# Patient Record
Sex: FEMALE | Race: WHITE | NOT HISPANIC OR LATINO | Employment: FULL TIME | URBAN - METROPOLITAN AREA
[De-identification: names, ages, dates, MRNs, and addresses within clinical notes are randomized per-mention and may not be internally consistent; named-entity substitution may affect disease eponyms.]

---

## 2021-09-14 ENCOUNTER — HOSPITAL ENCOUNTER (EMERGENCY)
Facility: CLINIC | Age: 61
Discharge: HOME | End: 2021-09-14
Attending: EMERGENCY MEDICINE | Admitting: EMERGENCY MEDICINE
Payer: COMMERCIAL

## 2021-09-14 VITALS
DIASTOLIC BLOOD PRESSURE: 80 MMHG | HEIGHT: 67 IN | TEMPERATURE: 99 F | HEART RATE: 80 BPM | BODY MASS INDEX: 43.16 KG/M2 | WEIGHT: 275 LBS | RESPIRATION RATE: 29 BRPM | OXYGEN SATURATION: 100 % | SYSTOLIC BLOOD PRESSURE: 172 MMHG

## 2021-09-14 DIAGNOSIS — R06.00 DYSPNEA, UNSPECIFIED TYPE: ICD-10-CM

## 2021-09-14 DIAGNOSIS — R79.89 ELEVATED LFTS: ICD-10-CM

## 2021-09-14 DIAGNOSIS — R53.83 OTHER FATIGUE: ICD-10-CM

## 2021-09-14 DIAGNOSIS — E83.42 HYPOMAGNESEMIA: Primary | ICD-10-CM

## 2021-09-14 LAB
ALBUMIN SERPL-MCNC: 3.2 G/DL (ref 3.5–5)
ALP SERPL-CCNC: 157 U/L (ref 45–120)
ALT SERPL W P-5'-P-CCNC: 99 U/L (ref 0–45)
ANION GAP SERPL CALCULATED.3IONS-SCNC: 12 MMOL/L (ref 5–18)
AST SERPL W P-5'-P-CCNC: 156 U/L (ref 0–40)
BASOPHILS # BLD AUTO: 0 10E3/UL (ref 0–0.2)
BASOPHILS NFR BLD AUTO: 1 %
BILIRUB SERPL-MCNC: 2.1 MG/DL (ref 0–1)
BNP SERPL-MCNC: 38 PG/ML (ref 0–91)
BUN SERPL-MCNC: 4 MG/DL (ref 8–22)
CALCIUM SERPL-MCNC: 8.6 MG/DL (ref 8.5–10.5)
CHLORIDE BLD-SCNC: 95 MMOL/L (ref 98–107)
CO2 SERPL-SCNC: 24 MMOL/L (ref 22–31)
CREAT SERPL-MCNC: 0.58 MG/DL (ref 0.6–1.1)
D DIMER PPP FEU-MCNC: 0.44 UG/ML FEU (ref 0–0.5)
EOSINOPHIL # BLD AUTO: 0 10E3/UL (ref 0–0.7)
EOSINOPHIL NFR BLD AUTO: 1 %
ERYTHROCYTE [DISTWIDTH] IN BLOOD BY AUTOMATED COUNT: 12.5 % (ref 10–15)
GFR SERPL CREATININE-BSD FRML MDRD: >90 ML/MIN/1.73M2
GLUCOSE BLD-MCNC: 112 MG/DL (ref 70–125)
HCT VFR BLD AUTO: 36.9 % (ref 35–47)
HGB BLD-MCNC: 12.8 G/DL (ref 11.7–15.7)
IMM GRANULOCYTES # BLD: 0 10E3/UL
IMM GRANULOCYTES NFR BLD: 1 %
LYMPHOCYTES # BLD AUTO: 0.4 10E3/UL (ref 0.8–5.3)
LYMPHOCYTES NFR BLD AUTO: 9 %
MAGNESIUM SERPL-MCNC: 1.5 MG/DL (ref 1.8–2.6)
MCH RBC QN AUTO: 31.9 PG (ref 26.5–33)
MCHC RBC AUTO-ENTMCNC: 34.7 G/DL (ref 31.5–36.5)
MCV RBC AUTO: 92 FL (ref 78–100)
MONOCYTES # BLD AUTO: 0.5 10E3/UL (ref 0–1.3)
MONOCYTES NFR BLD AUTO: 12 %
NEUTROPHILS # BLD AUTO: 3.3 10E3/UL (ref 1.6–8.3)
NEUTROPHILS NFR BLD AUTO: 76 %
NRBC # BLD AUTO: 0 10E3/UL
NRBC BLD AUTO-RTO: 0 /100
PLATELET # BLD AUTO: 121 10E3/UL (ref 150–450)
POTASSIUM BLD-SCNC: 3.8 MMOL/L (ref 3.5–5)
PROT SERPL-MCNC: 6.7 G/DL (ref 6–8)
RBC # BLD AUTO: 4.01 10E6/UL (ref 3.8–5.2)
SODIUM SERPL-SCNC: 131 MMOL/L (ref 136–145)
TROPONIN I SERPL-MCNC: <0.01 NG/ML (ref 0–0.29)
WBC # BLD AUTO: 4.4 10E3/UL (ref 4–11)

## 2021-09-14 PROCEDURE — 83880 ASSAY OF NATRIURETIC PEPTIDE: CPT | Performed by: EMERGENCY MEDICINE

## 2021-09-14 PROCEDURE — 96361 HYDRATE IV INFUSION ADD-ON: CPT

## 2021-09-14 PROCEDURE — 93005 ELECTROCARDIOGRAM TRACING: CPT | Performed by: EMERGENCY MEDICINE

## 2021-09-14 PROCEDURE — 83735 ASSAY OF MAGNESIUM: CPT | Performed by: EMERGENCY MEDICINE

## 2021-09-14 PROCEDURE — 85379 FIBRIN DEGRADATION QUANT: CPT | Performed by: EMERGENCY MEDICINE

## 2021-09-14 PROCEDURE — 96360 HYDRATION IV INFUSION INIT: CPT

## 2021-09-14 PROCEDURE — 99284 EMERGENCY DEPT VISIT MOD MDM: CPT | Mod: 25

## 2021-09-14 PROCEDURE — 84484 ASSAY OF TROPONIN QUANT: CPT | Performed by: EMERGENCY MEDICINE

## 2021-09-14 PROCEDURE — 258N000003 HC RX IP 258 OP 636: Performed by: EMERGENCY MEDICINE

## 2021-09-14 PROCEDURE — 85025 COMPLETE CBC W/AUTO DIFF WBC: CPT | Performed by: EMERGENCY MEDICINE

## 2021-09-14 PROCEDURE — 82040 ASSAY OF SERUM ALBUMIN: CPT | Performed by: EMERGENCY MEDICINE

## 2021-09-14 PROCEDURE — 36415 COLL VENOUS BLD VENIPUNCTURE: CPT | Performed by: EMERGENCY MEDICINE

## 2021-09-14 PROCEDURE — 93005 ELECTROCARDIOGRAM TRACING: CPT

## 2021-09-14 RX ADMIN — SODIUM CHLORIDE 500 ML: 9 INJECTION, SOLUTION INTRAVENOUS at 08:58

## 2021-09-14 ASSESSMENT — ENCOUNTER SYMPTOMS
NERVOUS/ANXIOUS: 1
DIARRHEA: 0
DIZZINESS: 0
VOMITING: 0
HEMATURIA: 0
BLOOD IN STOOL: 0
SORE THROAT: 0
FEVER: 0
DIAPHORESIS: 0
SHORTNESS OF BREATH: 1
CHILLS: 0
NAUSEA: 0
JOINT SWELLING: 0
LIGHT-HEADEDNESS: 1
CONFUSION: 0
ABDOMINAL PAIN: 0
DYSURIA: 0

## 2021-09-14 ASSESSMENT — MIFFLIN-ST. JEOR: SCORE: 1855.02

## 2021-09-14 NOTE — ED PROVIDER NOTES
"  Emergency Department Encounter     Evaluation Date & Time:   9/14/2021  8:11 AM    CHIEF COMPLAINT:  Shortness of Breath and Generalized Weakness      Triage Note:The patient presents to the ED with shortness of breath that has been increasing over the past 3 weeks. The patient reports she is feeling shaky as well. The patient reports being stressed out at work and attributes some of this to \"stress\". The patient reports she does not have a primary care doctor. She notes her legs have become swollen recently as well.         ED COURSE & MEDICAL DECISION MAKING:     ED Course as of Sep 14 1317   Tue Sep 14, 2021   0949 D-dimer negative. Will not pursue PE further.  Trop negative.  Mag 1.5. Will replace orally as outpatient and have her follow up on this.  LFTs and bili a little elevated. Pt has no real abdominal pain/tenderness. Will discuss with her.      1023 Pt does admit to somewhat heavier drinking, but states intermittent. Discussed LFTs/bili and she will follow up as outpatient.  Primary care referral placed. Pt also will call a clinic she thought about seeing. She previously saw [facility], but her doctor left.          8:14 AM I met with the patient to gather history and to perform my initial exam. I discussed the plan for care while in the Emergency Department. PPE (gloves, eye protection, and N95 mask) was worn by me during patient encounters while patient wore mask. Pt here with ongoing symptoms of shortness of breath, feeling shaky for the past 3 weeks.  Pt lives alone, does not get regular healthcare, so she came in now as she was getting more anxious. She does report a lot of anxiety/stress at work and related to unexpected death of her boyfriend 1.5 years ago from PE.  Pt with HR around 100, normal RA O2 sats.  Will get labs, rule out PE with d-dimer as pt is low risk but HR around 100.  Nothing to suggest covid-19.  If workup all negative, will refer to primary care.    10:08 AM I re-evaluated " "and updated patient. She is reassured, HR 80s.  We discussed plans for discharge and patient is agreeable.  Referred to primary care provider for further evaluation and better primary care.  Pt understands return precautions.      At the conclusion of the encounter I discussed the results of all the tests and the disposition. The questions were answered. The patient or family acknowledged understanding and was agreeable with the care plan.      MEDICATIONS GIVEN IN THE EMERGENCY DEPARTMENT:  Medications   0.9% sodium chloride BOLUS (0 mLs Intravenous Stopped 9/14/21 1049)       NEW PRESCRIPTIONS STARTED AT TODAY'S ED VISIT:  Discharge Medication List as of 9/14/2021 10:51 AM      START taking these medications    Details   magnesium oxide (MAG-OX) 400 (241.3 Mg) MG tablet Take 1 tablet (400 mg) by mouth 2 times daily for 14 days, Disp-28 tablet, R-0, Local Print             HPI   HPI     Dafne D Kenny is a 61 year old female without a pertinent history who presents to this ED by walk in for evaluation of shortness of breath.    Patient reports shortness of breath and feeling \"shaky\" for the past 3 weeks. She denies any associated chest pain, vomiting, diaphoresis, diarrhea, melena, or hematochezia. Patient does note increased stress and anxiety related to work, as well as the unexpected loss of her boyfriend 1.5 years ago to a blood clot. Patient admits to bilateral ankle swelling for the past month and notes she traveled to [place] at the end of July. She denies a history of DVT or PE. Patient presents to ED today as she felt persistent shakiness and lightheadedness this morning and became concerned as she lives alone. Patient is fully vaccinated for COVID and denies sick contacts. Patient works from home. She does not receive regular primary care and denies prior diagnoses of medical conditions. Patient denies additional medical concerns or complaints at this time.      REVIEW OF SYSTEMS:  Review of Systems " "  Constitutional: Negative for chills, diaphoresis and fever.        Positive for \"shaky\".   HENT: Negative for sore throat.    Eyes: Negative for visual disturbance.   Respiratory: Positive for shortness of breath.    Cardiovascular: Positive for leg swelling (ankles bilaterally). Negative for chest pain.   Gastrointestinal: Negative for abdominal pain, blood in stool, diarrhea, nausea and vomiting.        No hematochezia or melena.   Endocrine: Negative for polyuria.   Genitourinary: Negative for dysuria and hematuria.        - urinary changes   Musculoskeletal: Negative for joint swelling.   Skin: Negative for rash.   Neurological: Positive for light-headedness. Negative for dizziness.   Psychiatric/Behavioral: Negative for confusion. The patient is nervous/anxious.    All other systems reviewed and are negative.      Medical History   History reviewed. No pertinent past medical history.    History reviewed. No pertinent surgical history.    History reviewed. No pertinent family history.    Social History     Tobacco Use     Smoking status: None   Substance Use Topics     Alcohol use: None     Drug use: None       magnesium oxide (MAG-OX) 400 (241.3 Mg) MG tablet        Physical Exam     Triage Vitals:  ED Triage Vitals   Enc Vitals Group      BP       Pulse       Resp       Temp       Temp src       SpO2       Weight       Height       Head Circumference       Peak Flow       Pain Score       Pain Loc       Pain Edu?       Excl. in GC?         Vitals:  BP (!) 172/80   Pulse 80   Temp 99  F (37.2  C) (Oral)   Resp 29   Ht 1.702 m (5' 7\")   Wt 124.7 kg (275 lb)   SpO2 100%   BMI 43.07 kg/m      PHYSICAL EXAM:   Physical Exam  Vitals and nursing note reviewed.   Constitutional:       General: She is not in acute distress.     Appearance: Normal appearance. She is obese.   HENT:      Head: Normocephalic and atraumatic.      Nose: Nose normal.      Mouth/Throat:      Mouth: Mucous membranes are moist.   Eyes: "      Pupils: Pupils are equal, round, and reactive to light.   Neck:      Vascular: No JVD.   Cardiovascular:      Rate and Rhythm: Normal rate and regular rhythm.      Pulses: Normal pulses.           Radial pulses are 2+ on the right side and 2+ on the left side.        Dorsalis pedis pulses are 2+ on the right side and 2+ on the left side.   Pulmonary:      Effort: Pulmonary effort is normal. No respiratory distress.      Breath sounds: Normal breath sounds.   Abdominal:      Palpations: Abdomen is soft.      Tenderness: There is no abdominal tenderness.   Musculoskeletal:      Cervical back: Full passive range of motion without pain and neck supple.      Comments: No calf tenderness. Trace bilateral ankle and pedal edema.   Skin:     General: Skin is warm.      Findings: No rash.   Neurological:      General: No focal deficit present.      Mental Status: She is alert. Mental status is at baseline.      Comments: Fluent speech, no acute lateralizing deficits   Psychiatric:         Mood and Affect: Mood is anxious.         Behavior: Behavior normal.       Results     LAB:  All pertinent labs reviewed and interpreted  Labs Ordered and Resulted from Time of ED Arrival Up to the Time of Departure from the ED   COMPREHENSIVE METABOLIC PANEL - Abnormal; Notable for the following components:       Result Value    Sodium 131 (*)     Chloride 95 (*)     Urea Nitrogen 4 (*)     Creatinine 0.58 (*)     Alkaline Phosphatase 157 (*)      (*)     ALT 99 (*)     Albumin 3.2 (*)     Bilirubin Total 2.1 (*)     All other components within normal limits   MAGNESIUM - Abnormal; Notable for the following components:    Magnesium 1.5 (*)     All other components within normal limits   CBC WITH PLATELETS AND DIFFERENTIAL - Abnormal; Notable for the following components:    Platelet Count 121 (*)     Absolute Lymphocytes 0.4 (*)     All other components within normal limits   TROPONIN I - Normal   D DIMER QUANTITATIVE - Normal     Narrative:     This D-dimer assay is intended for use in conjunction with a clinical pretest probability assessment model to exclude pulmonary embolism (PE) and deep venous thrombosis (DVT) in outpatients suspected of PE or DVT. The cut-off value is 0.50 ug/mL FEU.   B-TYPE NATRIURETIC PEPTIDE ([facility] ONLY) - Normal   CBC WITH PLATELETS & DIFFERENTIAL    Narrative:     The following orders were created for panel order CBC with platelets differential.  Procedure                               Abnormality         Status                     ---------                               -----------         ------                     CBC with platelets and d...[[id number]]  Abnormal            Final result                 Please view results for these tests on the individual orders.   PERIPHERAL IV CATHETER   CARDIAC CONTINUOUS MONITORING       RADIOLOGY:  No orders to display                ECG:  NSR, rate 90, normal intervals, no acute ischemia    I have independently reviewed and interpreted the EKG(s) documented above     PROCEDURES:  Procedures:      FINAL IMPRESSION:    ICD-10-CM    1. Hypomagnesemia  E83.42 Primary Care Referral (ED)   2. Other fatigue  R53.83    3. Dyspnea, unspecified type  R06.00    4. Elevated LFTs  R79.89        0 minutes of critical care time      I, Kierra Steinberg, am serving as a scribe to document services personally performed by Dr. Kalpesh Long, based on my observations and the provider's statements to me. I, Kalpesh Long, DO attest that Kierra Steinberg is acting in a scribe capacity, has observed my performance of the services and has documented them in accordance with my direction.      Kalpesh Long DO  Emergency Medicine  [facility] EMERGENCY ROOM  9/14/2021  8:13 AM        Kalpesh Long MD  09/14/21 5449

## 2021-09-14 NOTE — ED TRIAGE NOTES
"The patient presents to the ED with shortness of breath that has been increasing over the past 3 weeks. The patient reports she is feeling shaky as well. The patient reports being stressed out at work and attributes some of this to \"stress\". The patient reports she does not have a primary care doctor. She notes her legs have become swollen recently as well.   "

## 2021-09-14 NOTE — DISCHARGE INSTRUCTIONS
Take magnesium supplement as directed and have this level rechecked. Follow up with a primary clinic - referral placed, but you can certainly call and see if there is a clinic you want to follow up with. Return for new/worsening symptoms as we discussed.     Follow up on magnesium level and liver enzymes as we discussed.

## 2021-09-16 LAB
ATRIAL RATE - MUSE: 90 BPM
DIASTOLIC BLOOD PRESSURE - MUSE: 89 MMHG
INTERPRETATION ECG - MUSE: NORMAL
P AXIS - MUSE: 49 DEGREES
PR INTERVAL - MUSE: 164 MS
QRS DURATION - MUSE: 88 MS
QT - MUSE: 388 MS
QTC - MUSE: 474 MS
R AXIS - MUSE: -29 DEGREES
SYSTOLIC BLOOD PRESSURE - MUSE: 197 MMHG
T AXIS - MUSE: 20 DEGREES
VENTRICULAR RATE- MUSE: 90 BPM

## 2021-11-15 ENCOUNTER — NURSE TRIAGE (OUTPATIENT)
Dept: NURSING | Facility: CLINIC | Age: 61
End: 2021-11-15
Payer: COMMERCIAL

## 2021-11-15 NOTE — TELEPHONE ENCOUNTER
Nurse Triage SBAR    Is this a 2nd Level Triage? NO    Situation:    Frequent urination  Severe back pain.     Background :     Back pain for about 1 week.   Lifted something heavy and thought pt hurt her back at that time.   Sits all day for work and a chair that is not ergonomic.     Assessment :    -Lower Back pain, Mild pain currently.    -Patient states pain can get severe intermittently with activity and movement.   -Sx started last week and has been ongoing.   -Urinary Frequency only sx. Denies fevers, blood in urine, pain/burning with urination, cloudy urine.    -Patient was lifting things last week and felt as if she might have injured her back.   -Denies numbness/tingling/weakness  -Denies chest pain or SOB.     Recommendation :     Per protocol, recommendations are for patient to be seen today or tomorrow in office. If no openings UCC was advised. Patient verbalized understanding and agrees with plan. Advised to call back if patient develops any new or worsening sx.      Protocol Recommended Disposition: Routine office follow-up appointment /today or tomorrow in office. UCC advised if no openings.     Chio Blank RN, BSN Nurse Triage Advisor 10:33 AM 11/15/2021       Reason for Disposition    Age > 50 and no history of prior similar back pain    Additional Information    Negative: Passed out (i.e., fainted, collapsed and was not responding)    Negative: Shock suspected (e.g., cold/pale/clammy skin, too weak to stand, low BP, rapid pulse)    Negative: Sounds like a life-threatening emergency to the triager    Negative: Major injury to the back (e.g., MVA, fall > 10 feet or 3 meters, penetrating injury, etc.)    Negative: Pain in the upper back over the ribs (rib cage) that radiates (travels) into the chest    Negative: Pain in the upper back over the ribs (rib cage) and worsened by coughing (or clearly increases with breathing)    Negative: SEVERE back pain of sudden onset and age > 60    Negative:  SEVERE abdominal pain (e.g., excruciating)    Negative: Abdominal pain and age > 60    Negative: Unable to urinate (or only a few drops) and bladder feels very full    Negative: Loss of bladder or bowel control (urine or bowel incontinence; wetting self, leaking stool) of new onset    Negative: Blood in urine (red, pink, or tea-colored)    Negative: Numbness (loss of sensation) in groin or rectal area    Negative: Pain radiates into groin, scrotum    Negative: Vomiting and pain over lower ribs of back (i.e., flank - kidney area)    Negative: Weakness of a leg or foot (e.g., unable to bear weight, dragging foot)    Negative: Patient sounds very sick or weak to the triager    Negative: Fever > 100.4 F (38.0 C) and flank pain    Negative: Pain or burning with passing urine (urination)    Negative: SEVERE back pain (e.g., excruciating, unable to do any normal activities) and not improved after pain medicine and CARE ADVICE    Negative: Numbness in an arm or hand (i.e., loss of sensation) and upper back pain    Negative: Numbness in a leg or foot (i.e., loss of sensation)    Negative: High-risk adult (e.g., history of cancer, history of HIV, or history of IV drug abuse)    Negative: Painful rash with multiple small blisters grouped together (i.e., dermatomal distribution or 'band' or 'stripe')    Negative: Pain radiates into the thigh or further down the leg, and in both legs    Protocols used: BACK PAIN-A-[place]  COVID 19 Nurse Triage Plan/Patient Instructions    Please be aware that novel coronavirus (COVID-19) may be circulating in the community. If you develop symptoms such as fever, cough, or SOB or if you have concerns about the presence of another infection including coronavirus (COVID-19), please contact your health care provider or visit [email or web address].     Disposition/Instructions    In-Person Visit with provider recommended. Reference Visit Selection Guide.    Thank you for taking steps to prevent  the spread of this virus.  o Limit your contact with others.  o Wear a simple mask to cover your cough.  o Wash your hands well and often.    Resources    [facility]: About COVID-19: [email or web address]    CDC: What to Do If You're Sick: www.cdc.gov/coronavirus/2019-ncov/about/steps-when-sick.html    CDC: Ending Home Isolation: www.cdc.gov/coronavirus/2019-ncov/hcp/disposition-in-home-patients.html     CDC: Caring for Someone: www.cdc.gov/coronavirus/2019-ncov/if-you-are-sick/care-for-someone.html     [facility]: Interim Guidance for Hospital Discharge to Home: www.[email or web address]    [facility] clinical trials (COVID-19 research studies): [email or web address]-clinical-trials     Below are the COVID-19 hotlines at the [place] Department of [facility] ([facility]). Interpreters are available.   o For health questions: Call 405-211-4843 or 1-187.689.8504 (7 a.m. to 7 p.m.)  o For questions about schools and childcare: Call 001-059-5487 or 1-839.487.4747 (7 a.m. to 7 p.m.)

## 2021-11-16 ENCOUNTER — APPOINTMENT (OUTPATIENT)
Dept: CT IMAGING | Facility: HOSPITAL | Age: 61
End: 2021-11-16
Attending: EMERGENCY MEDICINE
Payer: COMMERCIAL

## 2021-11-16 ENCOUNTER — HOSPITAL ENCOUNTER (EMERGENCY)
Facility: HOSPITAL | Age: 61
Discharge: HOME | End: 2021-11-16
Admitting: STUDENT IN AN ORGANIZED HEALTH CARE EDUCATION/TRAINING PROGRAM
Payer: COMMERCIAL

## 2021-11-16 ENCOUNTER — APPOINTMENT (OUTPATIENT)
Dept: RADIOLOGY | Facility: HOSPITAL | Age: 61
End: 2021-11-16
Attending: EMERGENCY MEDICINE
Payer: COMMERCIAL

## 2021-11-16 VITALS
WEIGHT: 275 LBS | HEART RATE: 100 BPM | DIASTOLIC BLOOD PRESSURE: 73 MMHG | RESPIRATION RATE: 18 BRPM | OXYGEN SATURATION: 98 % | BODY MASS INDEX: 43.07 KG/M2 | SYSTOLIC BLOOD PRESSURE: 143 MMHG | TEMPERATURE: 98.4 F

## 2021-11-16 DIAGNOSIS — E83.42 HYPOMAGNESEMIA: ICD-10-CM
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
## 2021-11-16 DIAGNOSIS — R60.9 DEPENDENT EDEMA: ICD-10-CM

## 2021-11-16 DIAGNOSIS — R79.89 ELEVATED LFTS: ICD-10-CM

## 2021-11-16 DIAGNOSIS — M54.50 MIDLINE LOW BACK PAIN WITHOUT SCIATICA, UNSPECIFIED CHRONICITY: ICD-10-CM

## 2021-11-16 DIAGNOSIS — Z78.9 ALCOHOL USE: ICD-10-CM

## 2021-11-16 LAB
ALBUMIN SERPL-MCNC: 3.3 G/DL (ref 3.5–5)
ALBUMIN UR-MCNC: NEGATIVE MG/DL
ALP SERPL-CCNC: 191 U/L (ref 45–120)
ALT SERPL W P-5'-P-CCNC: 37 U/L (ref 0–45)
ANION GAP SERPL CALCULATED.3IONS-SCNC: 14 MMOL/L (ref 5–18)
APPEARANCE UR: ABNORMAL
AST SERPL W P-5'-P-CCNC: 69 U/L (ref 0–40)
BACTERIA #/AREA URNS HPF: ABNORMAL /HPF
BASOPHILS # BLD AUTO: 0.1 10E3/UL (ref 0–0.2)
BASOPHILS NFR BLD AUTO: 1 %
BILIRUB DIRECT SERPL-MCNC: 0.6 MG/DL
BILIRUB SERPL-MCNC: 1.8 MG/DL (ref 0–1)
BILIRUB UR QL STRIP: NEGATIVE
BNP SERPL-MCNC: 39 PG/ML (ref 0–91)
BUN SERPL-MCNC: 4 MG/DL (ref 8–22)
CALCIUM SERPL-MCNC: 9.2 MG/DL (ref 8.5–10.5)
CHLORIDE BLD-SCNC: 98 MMOL/L (ref 98–107)
CO2 SERPL-SCNC: 22 MMOL/L (ref 22–31)
COLOR UR AUTO: YELLOW
CREAT SERPL-MCNC: 0.63 MG/DL (ref 0.6–1.1)
EOSINOPHIL # BLD AUTO: 0 10E3/UL (ref 0–0.7)
EOSINOPHIL NFR BLD AUTO: 1 %
ERYTHROCYTE [DISTWIDTH] IN BLOOD BY AUTOMATED COUNT: 15 % (ref 10–15)
FLUAV RNA SPEC QL NAA+PROBE: NEGATIVE
FLUBV RNA RESP QL NAA+PROBE: NEGATIVE
GFR SERPL CREATININE-BSD FRML MDRD: >90 ML/MIN/1.73M2
GLUCOSE BLD-MCNC: 126 MG/DL (ref 70–125)
GLUCOSE UR STRIP-MCNC: NEGATIVE MG/DL
HCT VFR BLD AUTO: 37.4 % (ref 35–47)
HGB BLD-MCNC: 12.7 G/DL (ref 11.7–15.7)
HGB UR QL STRIP: NEGATIVE
IMM GRANULOCYTES # BLD: 0.1 10E3/UL
IMM GRANULOCYTES NFR BLD: 1 %
KETONES UR STRIP-MCNC: NEGATIVE MG/DL
LEUKOCYTE ESTERASE UR QL STRIP: ABNORMAL
LYMPHOCYTES # BLD AUTO: 0.7 10E3/UL (ref 0.8–5.3)
LYMPHOCYTES NFR BLD AUTO: 9 %
MAGNESIUM SERPL-MCNC: 1.4 MG/DL (ref 1.8–2.6)
MCH RBC QN AUTO: 32.2 PG (ref 26.5–33)
MCHC RBC AUTO-ENTMCNC: 34 G/DL (ref 31.5–36.5)
MCV RBC AUTO: 95 FL (ref 78–100)
MONOCYTES # BLD AUTO: 0.6 10E3/UL (ref 0–1.3)
MONOCYTES NFR BLD AUTO: 8 %
NEUTROPHILS # BLD AUTO: 6 10E3/UL (ref 1.6–8.3)
NEUTROPHILS NFR BLD AUTO: 80 %
NITRATE UR QL: NEGATIVE
NRBC # BLD AUTO: 0 10E3/UL
NRBC BLD AUTO-RTO: 0 /100
PH UR STRIP: 5.5 [PH] (ref 5–7)
PLATELET # BLD AUTO: 190 10E3/UL (ref 150–450)
POTASSIUM BLD-SCNC: 3.5 MMOL/L (ref 3.5–5)
PROT SERPL-MCNC: 7.3 G/DL (ref 6–8)
RBC # BLD AUTO: 3.94 10E6/UL (ref 3.8–5.2)
RBC URINE: 2 /HPF
SARS-COV-2 RNA RESP QL NAA+PROBE: NEGATIVE
SODIUM SERPL-SCNC: 134 MMOL/L (ref 136–145)
SP GR UR STRIP: 1.01 (ref 1–1.03)
SQUAMOUS EPITHELIAL: 31 /HPF
TROPONIN I SERPL-MCNC: <0.01 NG/ML (ref 0–0.29)
UROBILINOGEN UR STRIP-MCNC: <2 MG/DL
WBC # BLD AUTO: 7.4 10E3/UL (ref 4–11)
WBC URINE: 7 /HPF

## 2021-11-16 PROCEDURE — 99285 EMERGENCY DEPT VISIT HI MDM: CPT | Mod: 25

## 2021-11-16 PROCEDURE — 71046 X-RAY EXAM CHEST 2 VIEWS: CPT

## 2021-11-16 PROCEDURE — 83880 ASSAY OF NATRIURETIC PEPTIDE: CPT | Performed by: EMERGENCY MEDICINE

## 2021-11-16 PROCEDURE — 36415 COLL VENOUS BLD VENIPUNCTURE: CPT | Performed by: EMERGENCY MEDICINE

## 2021-11-16 PROCEDURE — 87086 URINE CULTURE/COLONY COUNT: CPT | Performed by: PHYSICIAN ASSISTANT

## 2021-11-16 PROCEDURE — 80048 BASIC METABOLIC PNL TOTAL CA: CPT | Performed by: EMERGENCY MEDICINE

## 2021-11-16 PROCEDURE — 83735 ASSAY OF MAGNESIUM: CPT | Performed by: PHYSICIAN ASSISTANT

## 2021-11-16 PROCEDURE — 85025 COMPLETE CBC W/AUTO DIFF WBC: CPT | Performed by: EMERGENCY MEDICINE

## 2021-11-16 PROCEDURE — 87636 SARSCOV2 & INF A&B AMP PRB: CPT | Performed by: EMERGENCY MEDICINE

## 2021-11-16 PROCEDURE — 82248 BILIRUBIN DIRECT: CPT | Performed by: PHYSICIAN ASSISTANT

## 2021-11-16 PROCEDURE — 70450 CT HEAD/BRAIN W/O DYE: CPT

## 2021-11-16 PROCEDURE — 93005 ELECTROCARDIOGRAM TRACING: CPT | Performed by: EMERGENCY MEDICINE

## 2021-11-16 PROCEDURE — 72100 X-RAY EXAM L-S SPINE 2/3 VWS: CPT

## 2021-11-16 PROCEDURE — 250N000013 HC RX MED GY IP 250 OP 250 PS 637: Performed by: PHYSICIAN ASSISTANT

## 2021-11-16 PROCEDURE — 81001 URINALYSIS AUTO W/SCOPE: CPT | Performed by: PHYSICIAN ASSISTANT

## 2021-11-16 PROCEDURE — 84484 ASSAY OF TROPONIN QUANT: CPT | Performed by: EMERGENCY MEDICINE

## 2021-11-16 RX ORDER — FUROSEMIDE 20 MG
20 TABLET ORAL DAILY
Qty: 3 TABLET | Refills: 0 | Status: SHIPPED | OUTPATIENT
Start: 2021-11-16 | End: 2021-12-13

## 2021-11-16 RX ORDER — OXYCODONE HYDROCHLORIDE 5 MG/1
5 TABLET ORAL EVERY 6 HOURS PRN
Qty: 8 TABLET | Refills: 0 | Status: ON HOLD | OUTPATIENT
Start: 2021-11-16 | End: 2021-12-21

## 2021-11-16 RX ORDER — MULTIVITAMIN WITH IRON
1 TABLET ORAL DAILY
Qty: 30 TABLET | Refills: 0 | Status: ON HOLD | OUTPATIENT
Start: 2021-11-16 | End: 2021-12-21

## 2021-11-16 RX ORDER — LIDOCAINE 50 MG/G
1 PATCH TOPICAL EVERY 24 HOURS
Qty: 10 PATCH | Refills: 0 | Status: SHIPPED | OUTPATIENT
Start: 2021-11-16 | End: 2021-11-26

## 2021-11-16 RX ORDER — MAGNESIUM OXIDE 400 MG/1
400 TABLET ORAL ONCE
Status: COMPLETED | OUTPATIENT
Start: 2021-11-16 | End: 2021-11-16

## 2021-11-16 RX ORDER — OXYCODONE HYDROCHLORIDE 5 MG/1
10 TABLET ORAL ONCE
Status: COMPLETED | OUTPATIENT
Start: 2021-11-16 | End: 2021-11-16

## 2021-11-16 RX ADMIN — MAGNESIUM OXIDE TAB 400 MG (241.3 MG ELEMENTAL MG) 400 MG: 400 (241.3 MG) TAB at 20:41

## 2021-11-16 RX ADMIN — OXYCODONE HYDROCHLORIDE 10 MG: 5 TABLET ORAL at 20:27

## 2021-11-16 ASSESSMENT — ENCOUNTER SYMPTOMS
VOMITING: 0
DYSURIA: 0
NECK STIFFNESS: 0
CHILLS: 0
PALPITATIONS: 0
HEMATURIA: 0
FEVER: 0
SHORTNESS OF BREATH: 1
ABDOMINAL PAIN: 0
WEAKNESS: 1
DIZZINESS: 0
FREQUENCY: 1
HEADACHES: 0
DIARRHEA: 0
NAUSEA: 0
DIAPHORESIS: 0
NECK PAIN: 0
BACK PAIN: 1
HEADACHES: 1
COUGH: 0

## 2021-11-16 NOTE — ED PROVIDER NOTES
ED Triage Provider Note  [facility]  Encounter Date: Nov 16, 2021    History:  Chief Complaint   Patient presents with     Leg Swelling     Shortness of Breath     Dafne Cox is a 61 year old female who presents to the ED with fall today, shortness of breath and leg swelling for the past month patient states that for the past month she has had bilateral lower extremity swelling.  Accompanied with shortness of breath.  She denies any chest pain, pleurisy, no ripping or tearing chest discomfort of the back or shoulders, no leg skin changes or joint pain.  Patient had a mechanical fall the day where she fell backwards in her bedroom hitting her head without loss of consciousness, again no vomiting she denies any medications for anticoagulation.    Review of Systems:  Review of Systems   Constitutional: Negative for fever.   Respiratory: Positive for shortness of breath.    Cardiovascular: Positive for leg swelling. Negative for chest pain.   Neurological: Positive for headaches.          Exam:  BP (!) 171/78   Pulse 112   Temp 98.3  F (36.8  C) (Oral)   Resp 20   SpO2 100%   General: No acute distress. Appears stated age.   Cardio: Regular rate, extremities well perfused  Resp: Normal work of breathing, distant breath sounds  Extremity: 1+ pitting edema bilateral lower extremities, no unilateral leg swelling, no joint erythema or warmth  Neuro: Alert. CN II-XII grossly intact. Grossly intact strength.       Medical Decision Making:  Patient arriving to the ED with problem as above. A medical screening exam was performed.  Labs and imaging orders initiated from Triage. The patient is appropriate to wait in triage.      HONEY RIVERS MD on 11/16/2021 at 4:23 PM      Lab/Imaging Results:  Results for orders placed or performed during the hospital encounter of 11/16/21   CT Head w/o Contrast    Impression    IMPRESSION: No acute intracranial process.   XR Chest 2 Views    Impression     IMPRESSION: Negative chest.   XR Lumbar Spine 2/3 Views    Impression    IMPRESSION: Transitional partially sacralized L5 vertebral body (sacralized on the right). Normal alignment. Normal vertebral body heights without compression fracture. Moderate disc space narrowing at L4-L5 with mild endplate sclerosis and marginal   osteophyte. The disc spaces are otherwise maintained. Mild to moderate facet arthropathy at L3-L4 and L4-L5. The sacrum and visualized iliac wings are unremarkable. Calcified structure within the pelvis measuring 3.5 cm, likely a uterine fibroid.   Basic metabolic panel   Result Value Ref Range    Sodium 134 (L) 136 - 145 mmol/L    Potassium 3.5 3.5 - 5.0 mmol/L    Chloride 98 98 - 107 mmol/L    Carbon Dioxide (CO2) 22 22 - 31 mmol/L    Anion Gap 14 5 - 18 mmol/L    Urea Nitrogen 4 (L) 8 - 22 mg/dL    Creatinine 0.63 0.60 - 1.10 mg/dL    Calcium 9.2 8.5 - 10.5 mg/dL    Glucose 126 (H) 70 - 125 mg/dL    GFR Estimate >90 >60 mL/min/1.73m2   Result Value Ref Range    Troponin I <0.01 0.00 - 0.29 ng/mL   B-Type Natriuretic Peptide ([facility] Only)   Result Value Ref Range    BNP 39 0 - 91 pg/mL   Symptomatic Influenza A/B & SARS-CoV2 (COVID-19) Virus PCR Multiplex Nasopharyngeal    Specimen: Nasopharyngeal; Swab   Result Value Ref Range    Influenza A target Negative Negative    Influenza B target Negative Negative    SARS CoV2 PCR Negative Negative   CBC with platelets and differential   Result Value Ref Range    WBC Count 7.4 4.0 - 11.0 10e3/uL    RBC Count 3.94 3.80 - 5.20 10e6/uL    Hemoglobin 12.7 11.7 - 15.7 g/dL    Hematocrit 37.4 35.0 - 47.0 %    MCV 95 78 - 100 fL    MCH 32.2 26.5 - 33.0 pg    MCHC 34.0 31.5 - 36.5 g/dL    RDW 15.0 10.0 - 15.0 %    Platelet Count 190 150 - 450 10e3/uL    % Neutrophils 80 %    % Lymphocytes 9 %    % Monocytes 8 %    % Eosinophils 1 %    % Basophils 1 %    % Immature Granulocytes 1 %    NRBCs per 100 WBC 0 <1 /100    Absolute Neutrophils 6.0 1.6 - 8.3 10e3/uL     Absolute Lymphocytes 0.7 (L) 0.8 - 5.3 10e3/uL    Absolute Monocytes 0.6 0.0 - 1.3 10e3/uL    Absolute Eosinophils 0.0 0.0 - 0.7 10e3/uL    Absolute Basophils 0.1 0.0 - 0.2 10e3/uL    Absolute Immature Granulocytes 0.1 (H) <=0.0 10e3/uL    Absolute NRBCs 0.0 10e3/uL       Interventions:  Medications - No data to display    Diagnosis:  No diagnosis found.      Ryan Becerril MD  Emergency Medicine  [facility] EMERGENCY DEPARTMENT       Ryan Becerril MD  11/16/21 8572

## 2021-11-16 NOTE — ED TRIAGE NOTES
Pt brought by [facility] medics from home for evaluation of BLE edema, dyspnea on exertion x 1-2 months, and a fall earlier today.     Pt states she went to kick a sweatshirt on the floor this morning when she lost her footing and fell backwards. Pt hit the back of her head on the carpeted floor. Denies LOC. Pt called 911 but declined hospital transport. Pt had a clinic appt this afternoon but she cancelled it, and ultimately decided to call 911 to come here for evaluation of the edema and dyspnea.    Denies anticoagulants. Denies cough or fever. Denies chest pain.

## 2021-11-17 LAB
ATRIAL RATE - MUSE: 105 BPM
BACTERIA UR CULT: NORMAL
DIASTOLIC BLOOD PRESSURE - MUSE: NORMAL MMHG
INTERPRETATION ECG - MUSE: NORMAL
P AXIS - MUSE: 38 DEGREES
PR INTERVAL - MUSE: 156 MS
QRS DURATION - MUSE: 84 MS
QT - MUSE: 370 MS
QTC - MUSE: 489 MS
R AXIS - MUSE: -17 DEGREES
SYSTOLIC BLOOD PRESSURE - MUSE: NORMAL MMHG
T AXIS - MUSE: 42 DEGREES
VENTRICULAR RATE- MUSE: 105 BPM

## 2021-11-17 NOTE — DISCHARGE INSTRUCTIONS
As we discussed, please consider discussing alcohol cessation with your primary care provider.  Your low magnesium levels likely due to your alcohol use and poor dietary magnesium intake.  I will place you on supplementation for home.  Your elevated liver function tests are likely due to your alcohol use, and should be followed by your primary care provider.  For your lower extremity swelling, please use Ace wraps at home, elevate the legs above heart level, and we will place you on 3 days of the water pill to see if this improves your symptoms.  I have placed a referral to the spine center for your ongoing back pain.  I will send you home with several tablets of oxycodone for breakthrough pain which is a strong pain medication.  This can make you drowsy -do not take this while working, driving, or operating heavy machinery.  I would attempt warm compresses, gentle stretching, and ibuprofen for pain relief at home.  It is very important that you see establish a regular primary care provider for continued outpatient work-up.  If at anytime you develop return of shortness of breath, chest pain, increased swelling in your legs, severe pain in your back, numbness in your groin, loss of control of your bladder or bowel, new weakness or numbness in your legs, another fall, or any new or concerning symptoms please return to the ER for further evaluation.    Your blood pressure was elevated in the emergencydepartment today and requires recheck and close follow-up in your primary care clinic. Untreated blood pressure can cause serious complications including, but not limited to stroke, heart attack/failure, and kidney disease.  Please make a close follow-up appointment to have this recheck performed. Please return to the emergency department immediately if you develop a severe headache, vision changes, chest pain, shortness of breath, orabdominal pain.

## 2021-11-17 NOTE — ED PROVIDER NOTES
Emergency Department Encounter   NAME: Dafne Cox ; AGE: 61 year old female ; YOB: 1960 ; MRN: [id number] ; PCP: No Ref-Primary, Physician   ED PROVIDER: Shikha Triana PA-C    Evaluation Date & Time:   11/16/2021  7:09 PM    CHIEF COMPLAINT:  Leg Swelling and Shortness of Breath      Impression and Plan   MDM: Dafne Cox is a 61 year old female with a pertinent history of alcohol use and back pain, who presents to the ED by EMS for evaluation of back pain.  The patient presented to the emergency department for evaluation after a mechanical fall today.  She attempted to kick a sweatshirt on the floor, lost her balance, and fell onto her buttocks and hit the back of her head on the carpeted floor.  She states that she became immediately upset following this, and felt short of breath and dizzy, prompting her to contact EMS.    Here in the ED, patient is afebrile and vitally stable.  Mildly tachycardic and hypertensive, but generally well-appearing and sitting comfortably in the bed.  She reports the shortness of breath has completely resolved.  Nursing note mentions ongoing dyspnea on exertion, however she denies this to me.  Her lungs are clear to auscultation without any crackles, wheezing or tightness.  No findings to suggest asthma or COPD.  No recent infectious symptoms, though Covid swab sent by nursing staff.  Chest x-ray obtained and shows no consolidation or infiltrate concerning for pneumonia, pleural effusion, pneumothorax, or rib fractures.  Screening EKG obtained shows mild sinus tachycardia though no evidence of acute ischemia.  No active chest pain and troponin is negative.  No symptoms to suggest ACS.  Patient reports a similar episode in the past when she was upset and anxious, and her shortness of breath is most consistent with anxiety/stress reaction.      She has had ongoing low back pain for several months.  Unchanged following the fall, though has been worsening recently.   She does have tenderness to her midline lumbar spine.  X-ray was obtained which showed no evidence of compression fracture or abnormal alignment.  Moderate disc space narrowing at L4-L5, and mild to moderate facet arthropathy at L3-L4 and L4-L5 which could certainly contribute to ongoing pain.  Patient is not experiencing any red flag symptoms at this time -no saddle anesthesia, bladder or bowel continence, or lower extremity weakness or numbness.  No findings to suggest cauda equina or indicate need for emergent MRI.  Given the length of her symptoms and worsening pain, I do feel that she would benefit from referral to the spine center which was placed.  Discussed potential of physical therapy, outpatient MRI, and will give her several tablets of oxycodone for breakthrough pain as this worked very well for her here in the ED.    She has been experiencing some urinary frequency though no other urinary symptoms.  Her urine sample was grossly contaminated with squamous cells which is the likely source of the small amount of leukocytes and moderate bacteria.  No nitrates.  Urine culture sent and pending, though my suspicion for acute infection is quite low which I discussed with the patient.  Will contact if urine culture comes back positive.    She does have edema to her bilateral lower ankles and feet which have been present for several months.  No pain or tenderness into the calf, skin changes, erythema, palpable cords or any findings to suggest DVT.  No history of CHF and chest x-ray is clear without pulmonary congestion and BNP is within normal limits.  Renal function normal.  Her edema is consistent with dependent edema and we discussed supportive measures including reduced salt intake, elevating, compressions, and will give her a 3-day course of Lasix.    Patient has been consuming around 8 beers per day for the past several months.  Her magnesium is again low today at 1.4 likely due to her alcohol intake and  poor dietary intake of magnesium.  We will place her on supplementation for home.  Advise discussion with her primary care provider in regards to alcohol cessation.  Her chronic liver dysfunction is again seen today likely consistent with cirrhosis due to her alcohol use.  No abdominal pain or GI symptoms and abdominal exam is benign.    Head CT obtained by triage staff which was negative for any acute intracranial traumatic injury.  Patient has no neck pain and C-spine is cleared using cervical spine rules.  Remainder of trauma exam unremarkable.  Not anticoagulated.  Had 1 beer earlier this morning and is clinically sober.  No neurologic deficits.    Patient had good relief of her back pain with a dose of oxycodone here in the ED.  She ambulated successfully to the restroom and back with the use of a walker, however nursing staff reported that she was picking it up and not relying on it.  She has a cane at home that she uses to ambulate.  With her improved pain, no concerning findings on work-up, stable vitals, and passing ambulatory challenge, she is a good candidate for discharge to home with continued follow-up in her outpatient clinic.  Patient is comfortable with this plan and found friend to drive home and stay with her tonight.  We reviewed the importance of follow-up both in her primary care clinic and with the spine center, prescriptions, and concerning signs and symptoms to return to the ED.  She verbalized understanding is comfortable with the plan.  Discharged home in good condition.     ED COURSE:  7:31 PM I met and introduced myself to the patient. I gathered initial history and performed my physical exam. We discussed plan for initial workup.   9:30 PM recheck the patient, and she ambulated successfully with a walker to the bathroom and back.  Nursing staff reports that she was not relying on the walker and was lifting it in the air.  9:45 PM We discussed discharge, follow-up, and reasons to return  to the emergency department.    At the conclusion of the encounter I discussed the results of all the tests and the disposition. The questions were answered. The patient or family acknowledged understanding and was agreeable with the care plan.    FINAL IMPRESSION:    ICD-10-CM    1. Fall, initial encounter  W19.XXXA    2. Midline low back pain without sciatica, unspecified chronicity  M54.50 Spine Referral   3. Alcohol use  Z72.89    4. Elevated LFTs  R79.89    5. Hypomagnesemia  E83.42    6. Dependent edema  R60.9          MEDICATIONS GIVEN IN THE EMERGENCY DEPARTMENT:  Medications   oxyCODONE (ROXICODONE) tablet 10 mg (10 mg Oral Given 11/16/21 2027)   magnesium oxide (MAG-OX) tablet 400 mg (400 mg Oral Given 11/16/21 2041)         NEW PRESCRIPTIONS STARTED AT TODAY'S ED VISIT:  New Prescriptions    FUROSEMIDE (LASIX) 20 MG TABLET    Take 1 tablet (20 mg) by mouth daily for 3 days    LIDOCAINE (LIDODERM) 5 % PATCH    Place 1 patch onto the skin every 24 hours for 10 days    MAGNESIUM 250 MG TABLET    Take 1 tablet (250 mg) by mouth daily    OXYCODONE (ROXICODONE) 5 MG TABLET    Take 1 tablet (5 mg) by mouth every 6 hours as needed for pain         HPI   Patient information was obtained from: Patient    Use of Intrepreter: N/A     Dafne MANZO Kenny is a 61 year old female with a pertinent history of alcohol use and back pain, who presents to the ED by EMS for evaluation of back pain.     The patient presents to the emergency department via EMS following a fall today.  She reports that she has had ongoing back pain for several months which she feels is exacerbated by working sitting at her kitchen table.  She has not been seen for this pain before, however it has been significantly worse recently.  This morning, she went to kick a sweatshirt on the floor when she lost her balance and fell backwards.  She landed on her buttocks, however did hit her head on the carpeted floor.  No LOC.  She initially called 911, however  declined hospital transport.  She then called them back and was transported to the ED.  Patient admits to drinking on average 8 beers per day and has been doing this for several months which she feels has been exacerbated by the pandemic.  She has only had one beer today.  No other illicit drug use.  After she fell, she states that she became very upset and had shortness of breath and dizziness.  The symptoms resolved prior to arrival in the emergency department and she states that her breathing currently feels at baseline.  She does have a mild headache, although no neck, back, chest, or abdominal pain.  She has had ongoing swelling to her bilateral ankles and feet, and had a appointment scheduled to her clinic today to further discuss this.  She does have      REVIEW OF SYSTEMS:  Review of Systems   Constitutional: Negative for chills, diaphoresis and fever.   Respiratory: Positive for shortness of breath (resolved). Negative for cough.    Cardiovascular: Positive for leg swelling. Negative for chest pain and palpitations.   Gastrointestinal: Negative for abdominal pain, diarrhea, nausea and vomiting.   Genitourinary: Positive for frequency. Negative for dysuria and hematuria.        Negative for saddle anesthesia.  Negative for bladder or bowel incontinence.   Musculoskeletal: Positive for back pain. Negative for neck pain and neck stiffness.   Skin: Negative for rash.   Neurological: Positive for weakness (generalized). Negative for dizziness, syncope and headaches.   All other systems reviewed and are negative.        Medical History     No past medical history on file.    No past surgical history on file.    No family history on file.    Social History     Tobacco Use     Smoking status: Not on file     Smokeless tobacco: Not on file   Substance Use Topics     Alcohol use: Not on file     Drug use: Not on file       furosemide (LASIX) 20 MG tablet  lidocaine (LIDODERM) 5 % patch  magnesium 250 MG  tablet  oxyCODONE (ROXICODONE) 5 MG tablet          Physical Exam     First Vitals:  Patient Vitals for the past 24 hrs:   BP Temp Temp src Pulse Resp SpO2 Weight   11/16/21 2100 (!) 143/73 -- -- 100 18 98 % --   11/16/21 2030 (!) 142/80 -- -- 98 18 99 % --   11/16/21 2000 (!) 184/88 -- -- 100 18 99 % --   11/16/21 1945 (!) 172/73 -- -- 101 18 98 % --   11/16/21 1930 (!) 175/68 98.4  F (36.9  C) Oral 100 18 100 % 124.7 kg (275 lb)   11/16/21 1624 (!) 171/78 98.3  F (36.8  C) Oral 112 20 100 % --         PHYSICAL EXAM:   Physical Exam  Vitals and nursing note reviewed.   Constitutional:       Appearance: She is not ill-appearing or toxic-appearing.      Comments: Patient is obese and disheveled.  Poor hygiene.  No acute distress and sitting comfortably in her bed.   HENT:      Head: Normocephalic and atraumatic.      Mouth/Throat:      Mouth: Mucous membranes are moist.   Eyes:      Extraocular Movements: Extraocular movements intact.      Pupils: Pupils are equal, round, and reactive to light.   Neck:      Comments: No midline spinal tenderness or palpable bony step-offs.  No pain with range of motion or axial loading.  Cardiovascular:      Rate and Rhythm: Normal rate and regular rhythm.      Pulses: Normal pulses.      Heart sounds: Normal heart sounds. No murmur heard.      Pulmonary:      Effort: Pulmonary effort is normal. No tachypnea or respiratory distress.      Breath sounds: Normal breath sounds. No decreased breath sounds, wheezing, rhonchi or rales.   Abdominal:      General: Bowel sounds are normal.      Palpations: Abdomen is soft.      Tenderness: There is no abdominal tenderness. There is no guarding or rebound.      Comments: No ascites or fluid wave.   Musculoskeletal:      Cervical back: Normal range of motion and neck supple.      Comments: Pelvis is stable.  Midline tenderness over lumbar spine.  No palpable bony step-offs.  2+ edema to her bilateral ankles and feet. No swelling or tenderness to  calves. No palpable cords or skin changes.  Extremities are warm without pallor or coolness.  2+ DP and PT pulses.   Skin:     General: Skin is warm and dry.      Comments: No jaundice.   Neurological:      Mental Status: She is alert and oriented to person, place, and time. Mental status is at baseline.      GCS: GCS eye subscore is 4. GCS verbal subscore is 5. GCS motor subscore is 6.      Cranial Nerves: No facial asymmetry.      Comments: Answering questions appropriately with normal speech.  No concern for intoxication.  Cranial nerves III through XII intact.  5 out of 5 strength with , flexion extension at the elbow joint, flexion at the shoulder joint, dorsiflexion and plantarflexion, hip flexion, and knee flexion.  Sensory exam to all extremities intact.             Results     LAB:  All pertinent labs reviewed and interpreted  Labs Ordered and Resulted from Time of ED Arrival to Time of ED Departure   BASIC METABOLIC PANEL - Abnormal       Result Value    Sodium 134 (*)     Potassium 3.5      Chloride 98      Carbon Dioxide (CO2) 22      Anion Gap 14      Urea Nitrogen 4 (*)     Creatinine 0.63      Calcium 9.2      Glucose 126 (*)     GFR Estimate >90     CBC WITH PLATELETS AND DIFFERENTIAL - Abnormal    WBC Count 7.4      RBC Count 3.94      Hemoglobin 12.7      Hematocrit 37.4      MCV 95      MCH 32.2      MCHC 34.0      RDW 15.0      Platelet Count 190      % Neutrophils 80      % Lymphocytes 9      % Monocytes 8      % Eosinophils 1      % Basophils 1      % Immature Granulocytes 1      NRBCs per 100 WBC 0      Absolute Neutrophils 6.0      Absolute Lymphocytes 0.7 (*)     Absolute Monocytes 0.6      Absolute Eosinophils 0.0      Absolute Basophils 0.1      Absolute Immature Granulocytes 0.1 (*)     Absolute NRBCs 0.0     UA MACROSCOPIC WITH REFLEX TO MICRO AND CULTURE - Abnormal    Color Urine Yellow      Appearance Urine Turbid (*)     Glucose Urine Negative      Bilirubin Urine Negative       Ketones Urine Negative      Specific Gravity Urine 1.008      Blood Urine Negative      pH Urine 5.5      Protein Albumin Urine Negative      Urobilinogen Urine <2.0      Nitrite Urine Negative      Leukocyte Esterase Urine 250 Bal/uL (*)     Bacteria Urine Moderate (*)     RBC Urine 2      WBC Urine 7 (*)     Squamous Epithelials Urine 31 (*)    MAGNESIUM - Abnormal    Magnesium 1.4 (*)    HEPATIC FUNCTION PANEL - Abnormal    Bilirubin Total 1.8 (*)     Bilirubin Direct 0.6 (*)     Protein Total 7.3      Albumin 3.3 (*)     Alkaline Phosphatase 191 (*)     AST 69 (*)     ALT 37     TROPONIN I - Normal    Troponin I <0.01     B-TYPE NATRIURETIC PEPTIDE ([facility] ONLY) - Normal    BNP 39     INFLUENZA A/B & SARS-COV2 PCR MULTIPLEX - Normal    Influenza A target Negative      Influenza B target Negative      SARS CoV2 PCR Negative     URINE CULTURE       RADIOLOGY:  CT Head w/o Contrast   Final Result   IMPRESSION: No acute intracranial process.      XR Chest 2 Views   Final Result   IMPRESSION: Negative chest.      XR Lumbar Spine 2/3 Views   Final Result   IMPRESSION: Transitional partially sacralized L5 vertebral body (sacralized on the right). Normal alignment. Normal vertebral body heights without compression fracture. Moderate disc space narrowing at L4-L5 with mild endplate sclerosis and marginal    osteophyte. The disc spaces are otherwise maintained. Mild to moderate facet arthropathy at L3-L4 and L4-L5. The sacrum and visualized iliac wings are unremarkable. Calcified structure within the pelvis measuring 3.5 cm, likely a uterine fibroid.            ECG:  Performed at: 18:35:42    Impression: Sinus tachycardia. Cannot rule out anterior infarct (cited on of before 14-SEP-2021). Abnormal ECG.     Rate: 105 bpm   Rhythm: Sinus tachycardia   Axis: -17   ND Interval: 156 ms   QRS Interval: 84 ms   QTc Interval: 489 ms   ST Changes: None   Comparison: When compared with ECG of 14-SEP-2021 08:46, T wave amplitude  has increased in anterior leads.     EKG results reviewed and interpreted by Dr. Becerril, ED MD.       Shikha Triana PA-C   Emergency Medicine   [facility] EMERGENCY DEPARTMENT       Shikha Triana PA-C  11/16/21 3341

## 2021-12-02 ENCOUNTER — OFFICE VISIT (OUTPATIENT)
Dept: PHYSICAL MEDICINE AND REHAB | Facility: CLINIC | Age: 61
End: 2021-12-02
Payer: COMMERCIAL

## 2021-12-02 VITALS — SYSTOLIC BLOOD PRESSURE: 189 MMHG | DIASTOLIC BLOOD PRESSURE: 86 MMHG | HEART RATE: 104 BPM

## 2021-12-02 DIAGNOSIS — M79.89 LEG SWELLING: ICD-10-CM

## 2021-12-02 DIAGNOSIS — M54.50 LUMBAR SPINE PAIN: Primary | ICD-10-CM
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
## 2021-12-02 DIAGNOSIS — R29.898 LEG WEAKNESS, BILATERAL: ICD-10-CM

## 2021-12-02 DIAGNOSIS — M54.2 CERVICAL SPINE PAIN: ICD-10-CM

## 2021-12-02 PROCEDURE — 99204 OFFICE O/P NEW MOD 45 MIN: CPT | Performed by: PHYSICAL MEDICINE & REHABILITATION

## 2021-12-02 RX ORDER — NABUMETONE 500 MG/1
500 TABLET, FILM COATED ORAL 2 TIMES DAILY PRN
Qty: 60 TABLET | Refills: 1 | Status: ON HOLD | OUTPATIENT
Start: 2021-12-02 | End: 2021-12-21

## 2021-12-02 ASSESSMENT — PAIN SCALES - GENERAL: PAINLEVEL: SEVERE PAIN (6)

## 2021-12-02 NOTE — PROGRESS NOTES
Assessment/Plan:      Dafne was seen today for back pain and neck pain.    Diagnoses and all orders for this visit:    Lumbar spine pain  -     MR Lumbar Spine w/o Contrast; Future  -     nabumetone (RELAFEN) 500 MG tablet; Take 1 tablet (500 mg) by mouth 2 times daily as needed for moderate pain    Leg weakness, bilateral  -     MR Lumbar Spine w/o Contrast; Future  -     MR Cervical Spine w/o Contrast; Future    Cervical spine pain  -     MR Cervical Spine w/o Contrast; Future  -     nabumetone (RELAFEN) 500 MG tablet; Take 1 tablet (500 mg) by mouth 2 times daily as needed for moderate pain    Fall, subsequent encounter  -     MR Cervical Spine w/o Contrast; Future    Leg swelling  -     US Lower Extremity Venous Duplex Bilateral; Future    Other orders  -     Spine Referral         Assessment: Pleasant 61 year old female with a history of anxiety and depression with:     1.  Several month history of progressive low back pain and bilateral lower extremity weakness that has been intermittent which significantly increased 2 weeks ago after a fall.  She fell backwards in her bedroom striking her head with loss of consciousness likely sustained a concussion and those symptoms appear to be mostly resolved.  Persistent back pain upper lumbar spine with bilateral lower extremity weakness so severe she cannot get up from a chair today.    2.  Cervical spine pain after a fall she does have some intrinsic hand weakness as well.  Question cervical myelopathy although no hyperreflexia today.    3.  2-month history of bilateral lower extremity swelling.    Discussion:    1.  She is a very interesting constellation of symptoms.  She has progressive lower extremity weakness and I suspect lumbar spinal stenosis which is progressive as she has had some bladder issues as well.  She has such weakness today that she cannot even stand up.  She also has some intrinsic hand weakness however the rest of her upper extremity strength  appears unremarkable.  She could have cervical stenosis thoracic stenosis or lumbar stenosis although no hyperreflexia.  All of this worsened after a fall.    2.  MRI of the lumbar spine and cervical spine to evaluate.    3.  We will obtain ultrasound studies of the lower extremities she has 2months of lower extremity edema.  We will look for chronic DVT.    4.  Trial nabumetone 500 mg twice a day as needed for pain.    5.  Follow-up with me in 2 weeks.  She is instructed to present to the emergency department if she has any further episodes of severe shortness of breath or progressive pain.  At a minimum they may be able to even do a CT scan in the emergency department to evaluate the neck and low back and even chest CT if she has significant shortness of breath at that time.      It was our pleasure caring for your patient today, if there any questions or concerns please do not hesitate to contact us.      Subjective:   Patient ID: Dafne Cox is a 61 year old female.    History of Present Illness: Patient presents at the request of Shikha Triana PA-C/the emergency department for evaluation of lumbar spine pain but she also has cervical spine pain.  For the past couple of months she has been having issues at home with weakness in her legs difficult for her to get up.  This started without any injury is been ongoing for quite some time difficult for her to give me a timeline.  With this she has had some urination issues words increased urination frequency at night and she had plan to see her primary care provider.   she was moving some things around in her apartment and try to shuffle a towel on the floor and fell backwards about 2 weeks ago.  She fell striking her head and lost consciousness.  She presented to the emergency department by EMS I reviewed the note.  Initially she tried to stay home after EMS arrived but then due to the pain she presented to the emergency department.  They are given a CT scan of  her head and lumbar spine imaging.  Given some oxycodone which she is only taken 3 of 8 tablets.  She had a negative CT of her head and although has had headaches no residual memory deficits from the loss of consciousness.    She has had persistent lumbar spine pain lumbosacral junction which is constant worse with prolonged sitting.  She is sits at a table in her kitchen to work on a laptop which is not ergonomically sound in slouches forward which she feels makes her pain worse but she also has significant lower extremity weakness and unable to do any standing.  She has leg swelling for the past at least 2 months and they are weak.  She also has had a right anterior shin wound for the past several months there is not completely healed.  She has no issues with her arms although occasionally has some arm pain and she has to lift herself up using her arms from a chair for the past several months due to leg weakness.  Pain is a 6/10 today.     She has also some accompanying shortness of breath with any activity.  This was noted during the ER visit a chest x-ray was doneWhich was read as negative.      Imaging: Plain films lumbar spine images personally reviewed along with CT scan of the head.  CT scan of the headWas unremarkable for acute changes.  Some generalized volume loss.    MRI lumbar spine shows normal alignment with sacralized L5 vertebrae.  Moderate disc height loss L4-5.  Mild to moderate degenerative changes of the facets.       Review of Systems: Complains of weight gain, headache, hoarseness, swelling of the feet, shortness of breath, cough, diarrhea, muscle pain, insomnia excessive tiredness and anxiety.  Denies fevers, weight loss, ringing in the ears, change in vision, chest pain, abdominal pain, nausea, vomiting, bowel or bladder incontinence, skin issues.  Remainder of 12 point review systems negative unless listed above.    History reviewed. No pertinent past medical history.    Family History    Problem Relation Age of Onset     Cancer Mother      Cancer Father          Social History     Socioeconomic History     Marital status: Single     Spouse name: None     Number of children: None     Years of education: None     Highest education level: None   Occupational History     None   Tobacco Use     Smoking status: Never Smoker     Smokeless tobacco: Never Used   Substance and Sexual Activity     Alcohol use: Yes     Drug use: None     Sexual activity: None   Other Topics Concern     Parent/sibling w/ CABG, MI or angioplasty before 65F 55M? Not Asked   Social History Narrative     None     Social Determinants of Health     Financial Resource Strain: Not on file   Food Insecurity: Not on file   Transportation Needs: Not on file   Physical Activity: Not on file   Stress: Not on file   Social Connections: Not on file   Intimate Partner Violence: Not on file   Housing Stability: Not on file       The following portions of the patient's history were reviewed and updated as appropriate: allergies, current medications, past family history, past medical history, past social history, past surgical history and problem list.    Oswestry (YANN) Questionnaire    OSWESTRY DISABILITY INDEX 12/2/2021   Count 10   Sum 23   Oswestry Score (%) 46       Neck Disability Index:  No flowsheet data found.      WHO 5: 10            Objective:   Physical Exam:    BP (!) 189/86 (BP Location: Left arm, Patient Position: Sitting, Cuff Size: Adult Large)   Pulse 104   There is no height or weight on file to calculate BMI.      General:  Well-appearing female in no acute distress.  Pleasant, cooperative, and interactive throughout the examination and interview.  Sitting in a wheelchair.  Unable to stand today.  CV: Trace bilateral lower extremity edema.  Lymphatics: No cervical lymphadenopathy palpated. Eyes: sclera clear. Skin: No rashes or lesions seen over the head/neck, hairline, arms.  She does have a 2 cm in diameter wound over the  right anterior shin.  Appears nonhealing..  Respirations unlabored.  MSK: Gait is not assessed that she is unable to stand from seated with proximal leg weakness..  Unable to assess heel toe stand or Romberg..  Spine: normal AP curves of the C, T, and L spine.  Unable to assess range of motion lumbar spine due to seated in wheelchair today.  Palpation: Tenderness to palpation over spinous process around L1-L3.  Extremities: Full range of motion of the elbows, and wrists with no effusions or tenderness to palpation.  Appears to have full range of motion of the hips and knees from seated.  No hypermobility of the upper or lower extremities.  Neurologic exam: Mental status: Patient is alert and oriented with normal affect.  Attention, knowledge, memory, and language are intact.  Normal coordination throughout the examination.  Reflexes are 0 and symmetric biceps, triceps, brachioradialis, patellar, and Achilles with  Negative Rosina's.  No clonus.  Sensation is intact to light touch throughout the upper and lower extremities bilaterally.  Manual muscle testing reveals 3/5 bilateral hip flexors, 4/5 bilateral knee extensors, 3/5 bilateral knee flexors, 3/5 bilateral ankle dorsiflexors 2/5 bilateral EHL and 2/5 bilateral ankle plantar flexors.   upper extremities: 4/5 bilateral interosseous of the upper extremities and wrist extensors.  5/5 elbow flexors and elbow extensors.   .    Negative seated and supine straight leg raise bilaterally.

## 2021-12-02 NOTE — PATIENT INSTRUCTIONS
1. An MRI was ordered for you today.  You will be contacted by scheduling within 3 days.    If you are not contacted, please call Radiology at [phone]. If the wait is too long, please try [facility] [phone] or ray [phone]. If you decide to schedule at [facility] or [place], please call us and we will fax the order    2. Nabumetone (which is an anti-inflammatory) medication is prescribed today. Take 1   Tablet 2 times a day as needed for pain. This medication should be taken with food and water to prevent any stomach upset. Do not take ibuprofen/Advil/Motrin/Aleve/naproxen while you take Nabumetone. Please call if you have any side effects.    3. Ultrasound of your legs to look for chronic DVT

## 2021-12-02 NOTE — LETTER
12/2/2021         RE: Dafne Cox  [address]  [place] [address]        Dear Colleague,    Thank you for referring your patient, Dafne Cox, to the [facility] [place]. Please see a copy of my visit note below.    Assessment/Plan:      Dafne was seen today for back pain and neck pain.    Diagnoses and all orders for this visit:    Lumbar spine pain  -     MR Lumbar Spine w/o Contrast; Future  -     nabumetone (RELAFEN) 500 MG tablet; Take 1 tablet (500 mg) by mouth 2 times daily as needed for moderate pain    Leg weakness, bilateral  -     MR Lumbar Spine w/o Contrast; Future  -     MR Cervical Spine w/o Contrast; Future    Cervical spine pain  -     MR Cervical Spine w/o Contrast; Future  -     nabumetone (RELAFEN) 500 MG tablet; Take 1 tablet (500 mg) by mouth 2 times daily as needed for moderate pain    Fall, subsequent encounter  -     MR Cervical Spine w/o Contrast; Future    Leg swelling  -     US Lower Extremity Venous Duplex Bilateral; Future    Other orders  -     Spine Referral         Assessment: Pleasant 61 year old female with a history of anxiety and depression with:     1.  Several month history of progressive low back pain and bilateral lower extremity weakness that has been intermittent which significantly increased 2 weeks ago after a fall.  She fell backwards in her bedroom striking her head with loss of consciousness likely sustained a concussion and those symptoms appear to be mostly resolved.  Persistent back pain upper lumbar spine with bilateral lower extremity weakness so severe she cannot get up from a chair today.    2.  Cervical spine pain after a fall she does have some intrinsic hand weakness as well.  Question cervical myelopathy although no hyperreflexia today.    3.  2-month history of bilateral lower extremity swelling.    Discussion:    1.  She is a very interesting constellation of symptoms.  She has progressive lower extremity  weakness and I suspect lumbar spinal stenosis which is progressive as she has had some bladder issues as well.  She has such weakness today that she cannot even stand up.  She also has some intrinsic hand weakness however the rest of her upper extremity strength appears unremarkable.  She could have cervical stenosis thoracic stenosis or lumbar stenosis although no hyperreflexia.  All of this worsened after a fall.    2.  MRI of the lumbar spine and cervical spine to evaluate.    3.  We will obtain ultrasound studies of the lower extremities she has 2months of lower extremity edema.  We will look for chronic DVT.    4.  Trial nabumetone 500 mg twice a day as needed for pain.    5.  Follow-up with me in 2 weeks.  She is instructed to present to the emergency department if she has any further episodes of severe shortness of breath or progressive pain.  At a minimum they may be able to even do a CT scan in the emergency department to evaluate the neck and low back and even chest CT if she has significant shortness of breath at that time.      It was our pleasure caring for your patient today, if there any questions or concerns please do not hesitate to contact us.      Subjective:   Patient ID: Dafne Cox is a 61 year old female.    History of Present Illness: Patient presents at the request of Shikha Triana PA-C/the emergency department for evaluation of lumbar spine pain but she also has cervical spine pain.  For the past couple of months she has been having issues at home with weakness in her legs difficult for her to get up.  This started without any injury is been ongoing for quite some time difficult for her to give me a timeline.  With this she has had some urination issues words increased urination frequency at night and she had plan to see her primary care provider.   she was moving some things around in her apartment and try to shuffle a towel on the floor and fell backwards about 2 weeks ago.  She fell  striking her head and lost consciousness.  She presented to the emergency department by EMS I reviewed the note.  Initially she tried to stay home after EMS arrived but then due to the pain she presented to the emergency department.  They are given a CT scan of her head and lumbar spine imaging.  Given some oxycodone which she is only taken 3 of 8 tablets.  She had a negative CT of her head and although has had headaches no residual memory deficits from the loss of consciousness.    She has had persistent lumbar spine pain lumbosacral junction which is constant worse with prolonged sitting.  She is sits at a table in her kitchen to work on a laptop which is not ergonomically sound in slouches forward which she feels makes her pain worse but she also has significant lower extremity weakness and unable to do any standing.  She has leg swelling for the past at least 2 months and they are weak.  She also has had a right anterior shin wound for the past several months there is not completely healed.  She has no issues with her arms although occasionally has some arm pain and she has to lift herself up using her arms from a chair for the past several months due to leg weakness.  Pain is a 6/10 today.     She has also some accompanying shortness of breath with any activity.  This was noted during the ER visit a chest x-ray was doneWhich was read as negative.      Imaging: Plain films lumbar spine images personally reviewed along with CT scan of the head.  CT scan of the headWas unremarkable for acute changes.  Some generalized volume loss.    MRI lumbar spine shows normal alignment with sacralized L5 vertebrae.  Moderate disc height loss L4-5.  Mild to moderate degenerative changes of the facets.       Review of Systems: Complains of weight gain, headache, hoarseness, swelling of the feet, shortness of breath, cough, diarrhea, muscle pain, insomnia excessive tiredness and anxiety.  Denies fevers, weight loss, ringing in  the ears, change in vision, chest pain, abdominal pain, nausea, vomiting, bowel or bladder incontinence, skin issues.  Remainder of 12 point review systems negative unless listed above.    History reviewed. No pertinent past medical history.    Family History   Problem Relation Age of Onset     Cancer Mother      Cancer Father          Social History     Socioeconomic History     Marital status: Single     Spouse name: None     Number of children: None     Years of education: None     Highest education level: None   Occupational History     None   Tobacco Use     Smoking status: Never Smoker     Smokeless tobacco: Never Used   Substance and Sexual Activity     Alcohol use: Yes     Drug use: None     Sexual activity: None   Other Topics Concern     Parent/sibling w/ CABG, MI or angioplasty before 65F 55M? Not Asked   Social History Narrative     None     Social Determinants of Health     Financial Resource Strain: Not on file   Food Insecurity: Not on file   Transportation Needs: Not on file   Physical Activity: Not on file   Stress: Not on file   Social Connections: Not on file   Intimate Partner Violence: Not on file   Housing Stability: Not on file       The following portions of the patient's history were reviewed and updated as appropriate: allergies, current medications, past family history, past medical history, past social history, past surgical history and problem list.    Oswestry (YANN) Questionnaire    OSWESTRY DISABILITY INDEX 12/2/2021   Count 10   Sum 23   Oswestry Score (%) 46       Neck Disability Index:  No flowsheet data found.      WHO 5: 10            Objective:   Physical Exam:    BP (!) 189/86 (BP Location: Left arm, Patient Position: Sitting, Cuff Size: Adult Large)   Pulse 104   There is no height or weight on file to calculate BMI.      General:  Well-appearing female in no acute distress.  Pleasant, cooperative, and interactive throughout the examination and interview.  Sitting in a  wheelchair.  Unable to stand today.  CV: Trace bilateral lower extremity edema.  Lymphatics: No cervical lymphadenopathy palpated. Eyes: sclera clear. Skin: No rashes or lesions seen over the head/neck, hairline, arms.  She does have a 2 cm in diameter wound over the right anterior shin.  Appears nonhealing..  Respirations unlabored.  MSK: Gait is not assessed that she is unable to stand from seated with proximal leg weakness..  Unable to assess heel toe stand or Romberg..  Spine: normal AP curves of the C, T, and L spine.  Unable to assess range of motion lumbar spine due to seated in wheelchair today.  Palpation: Tenderness to palpation over spinous process around L1-L3.  Extremities: Full range of motion of the elbows, and wrists with no effusions or tenderness to palpation.  Appears to have full range of motion of the hips and knees from seated.  No hypermobility of the upper or lower extremities.  Neurologic exam: Mental status: Patient is alert and oriented with normal affect.  Attention, knowledge, memory, and language are intact.  Normal coordination throughout the examination.  Reflexes are 0 and symmetric biceps, triceps, brachioradialis, patellar, and Achilles with  Negative Rosina's.  No clonus.  Sensation is intact to light touch throughout the upper and lower extremities bilaterally.  Manual muscle testing reveals 3/5 bilateral hip flexors, 4/5 bilateral knee extensors, 3/5 bilateral knee flexors, 3/5 bilateral ankle dorsiflexors 2/5 bilateral EHL and 2/5 bilateral ankle plantar flexors.   upper extremities: 4/5 bilateral interosseous of the upper extremities and wrist extensors.  5/5 elbow flexors and elbow extensors.   .    Negative seated and supine straight leg raise bilaterally.            Again, thank you for allowing me to participate in the care of your patient.        Sincerely,        Derik Hi, DO

## 2021-12-13 ENCOUNTER — APPOINTMENT (OUTPATIENT)
Dept: CT IMAGING | Facility: HOSPITAL | Age: 61
DRG: 640 | End: 2021-12-13
Attending: INTERNAL MEDICINE
Payer: COMMERCIAL

## 2021-12-13 ENCOUNTER — APPOINTMENT (OUTPATIENT)
Dept: RADIOLOGY | Facility: HOSPITAL | Age: 61
DRG: 640 | End: 2021-12-13
Attending: INTERNAL MEDICINE
Payer: COMMERCIAL

## 2021-12-13 ENCOUNTER — APPOINTMENT (OUTPATIENT)
Dept: ULTRASOUND IMAGING | Facility: HOSPITAL | Age: 61
DRG: 640 | End: 2021-12-13
Attending: INTERNAL MEDICINE
Payer: COMMERCIAL

## 2021-12-13 ENCOUNTER — HOSPITAL ENCOUNTER (INPATIENT)
Facility: HOSPITAL | Age: 61
LOS: 8 days | Discharge: NURSING, REHAB OR LONG-TERM CARE | DRG: 640 | End: 2021-12-21
Attending: INTERNAL MEDICINE | Admitting: FAMILY MEDICINE
Payer: COMMERCIAL

## 2021-12-13 DIAGNOSIS — R60.1 ANASARCA: ICD-10-CM

## 2021-12-13 DIAGNOSIS — K59.01 SLOW TRANSIT CONSTIPATION: ICD-10-CM

## 2021-12-13 DIAGNOSIS — I10 HYPERTENSION, UNSPECIFIED TYPE: ICD-10-CM

## 2021-12-13 DIAGNOSIS — E87.1 HYPONATREMIA: ICD-10-CM

## 2021-12-13 DIAGNOSIS — F32.1 CURRENT MODERATE EPISODE OF MAJOR DEPRESSIVE DISORDER, UNSPECIFIED WHETHER RECURRENT (H): ICD-10-CM

## 2021-12-13 DIAGNOSIS — G47.9 SLEEPING DIFFICULTY: ICD-10-CM

## 2021-12-13 DIAGNOSIS — R94.31 NONSPECIFIC ST-T WAVE ELECTROCARDIOGRAPHIC CHANGES: ICD-10-CM

## 2021-12-13 DIAGNOSIS — R62.7 FAILURE TO THRIVE IN ADULT: ICD-10-CM

## 2021-12-13 DIAGNOSIS — L97.919 ULCER OF RIGHT LOWER EXTREMITY, UNSPECIFIED ULCER STAGE (H): ICD-10-CM

## 2021-12-13 DIAGNOSIS — I72.8 SPLENIC ARTERY ANEURYSM (H): ICD-10-CM

## 2021-12-13 DIAGNOSIS — R52 GENERALIZED PAIN: ICD-10-CM

## 2021-12-13 DIAGNOSIS — E88.09 HYPOALBUMINEMIA: ICD-10-CM

## 2021-12-13 DIAGNOSIS — F10.20 ALCOHOL USE DISORDER, MODERATE, DEPENDENCE (H): Primary | ICD-10-CM

## 2021-12-13 DIAGNOSIS — R06.09 DYSPNEA ON EXERTION: ICD-10-CM

## 2021-12-13 DIAGNOSIS — R94.31 PROLONGED QT INTERVAL: ICD-10-CM

## 2021-12-13 DIAGNOSIS — Z91.81 AT RISK FOR FALLING: ICD-10-CM

## 2021-12-13 DIAGNOSIS — M62.81 MUSCLE WEAKNESS (GENERALIZED): ICD-10-CM

## 2021-12-13 LAB
ALBUMIN SERPL-MCNC: 3.3 G/DL (ref 3.5–5)
ALP SERPL-CCNC: 144 U/L (ref 45–120)
ALT SERPL W P-5'-P-CCNC: 49 U/L (ref 0–45)
ANION GAP SERPL CALCULATED.3IONS-SCNC: 17 MMOL/L (ref 5–18)
AST SERPL W P-5'-P-CCNC: 41 U/L (ref 0–40)
BASE EXCESS BLDV CALC-SCNC: -2.1 MMOL/L
BASOPHILS # BLD AUTO: 0.1 10E3/UL (ref 0–0.2)
BASOPHILS NFR BLD AUTO: 1 %
BILIRUB SERPL-MCNC: 1.6 MG/DL (ref 0–1)
BNP SERPL-MCNC: 11 PG/ML (ref 0–91)
BUN SERPL-MCNC: 7 MG/DL (ref 8–22)
CALCIUM SERPL-MCNC: 9.9 MG/DL (ref 8.5–10.5)
CHLORIDE BLD-SCNC: 93 MMOL/L (ref 98–107)
CO2 SERPL-SCNC: 20 MMOL/L (ref 22–31)
CREAT SERPL-MCNC: 0.61 MG/DL (ref 0.6–1.1)
D DIMER PPP FEU-MCNC: 0.78 UG/ML FEU (ref 0–0.5)
EOSINOPHIL # BLD AUTO: 0.1 10E3/UL (ref 0–0.7)
EOSINOPHIL NFR BLD AUTO: 1 %
ERYTHROCYTE [DISTWIDTH] IN BLOOD BY AUTOMATED COUNT: 13.8 % (ref 10–15)
FLUAV RNA SPEC QL NAA+PROBE: NEGATIVE
FLUBV RNA RESP QL NAA+PROBE: NEGATIVE
GFR SERPL CREATININE-BSD FRML MDRD: >90 ML/MIN/1.73M2
GLUCOSE BLD-MCNC: 95 MG/DL (ref 70–125)
HCO3 BLDV-SCNC: 23 MMOL/L (ref 24–30)
HCT VFR BLD AUTO: 33 % (ref 35–47)
HGB BLD-MCNC: 11.6 G/DL (ref 11.7–15.7)
IMM GRANULOCYTES # BLD: 0.2 10E3/UL
IMM GRANULOCYTES NFR BLD: 2 %
INR PPP: 1.11 (ref 0.9–1.15)
LACTATE SERPL-SCNC: 1.2 MMOL/L (ref 0.7–2)
LYMPHOCYTES # BLD AUTO: 1 10E3/UL (ref 0.8–5.3)
LYMPHOCYTES NFR BLD AUTO: 10 %
MAGNESIUM SERPL-MCNC: 1.8 MG/DL (ref 1.8–2.6)
MCH RBC QN AUTO: 32.1 PG (ref 26.5–33)
MCHC RBC AUTO-ENTMCNC: 35.2 G/DL (ref 31.5–36.5)
MCV RBC AUTO: 91 FL (ref 78–100)
MONOCYTES # BLD AUTO: 0.6 10E3/UL (ref 0–1.3)
MONOCYTES NFR BLD AUTO: 6 %
NEUTROPHILS # BLD AUTO: 8.3 10E3/UL (ref 1.6–8.3)
NEUTROPHILS NFR BLD AUTO: 80 %
NRBC # BLD AUTO: 0 10E3/UL
NRBC BLD AUTO-RTO: 0 /100
OXYHGB MFR BLDV: 60.8 % (ref 70–75)
PCO2 BLDV: 32 MM HG (ref 35–50)
PH BLDV: 7.44 [PH] (ref 7.35–7.45)
PLATELET # BLD AUTO: 225 10E3/UL (ref 150–450)
PO2 BLDV: 34 MM HG (ref 25–47)
POTASSIUM BLD-SCNC: 3.5 MMOL/L (ref 3.5–5)
PROT SERPL-MCNC: 8.1 G/DL (ref 6–8)
RBC # BLD AUTO: 3.61 10E6/UL (ref 3.8–5.2)
SAO2 % BLDV: 61.7 % (ref 70–75)
SARS-COV-2 RNA RESP QL NAA+PROBE: NEGATIVE
SODIUM SERPL-SCNC: 130 MMOL/L (ref 136–145)
TROPONIN I SERPL-MCNC: <0.01 NG/ML (ref 0–0.29)
WBC # BLD AUTO: 10 10E3/UL (ref 4–11)

## 2021-12-13 PROCEDURE — 83735 ASSAY OF MAGNESIUM: CPT | Performed by: EMERGENCY MEDICINE

## 2021-12-13 PROCEDURE — 250N000011 HC RX IP 250 OP 636: Performed by: INTERNAL MEDICINE

## 2021-12-13 PROCEDURE — 82805 BLOOD GASES W/O2 SATURATION: CPT | Performed by: INTERNAL MEDICINE

## 2021-12-13 PROCEDURE — 85610 PROTHROMBIN TIME: CPT | Performed by: EMERGENCY MEDICINE

## 2021-12-13 PROCEDURE — 83605 ASSAY OF LACTIC ACID: CPT | Performed by: EMERGENCY MEDICINE

## 2021-12-13 PROCEDURE — 71045 X-RAY EXAM CHEST 1 VIEW: CPT

## 2021-12-13 PROCEDURE — 71275 CT ANGIOGRAPHY CHEST: CPT

## 2021-12-13 PROCEDURE — 93970 EXTREMITY STUDY: CPT

## 2021-12-13 PROCEDURE — 36415 COLL VENOUS BLD VENIPUNCTURE: CPT | Performed by: EMERGENCY MEDICINE

## 2021-12-13 PROCEDURE — 36415 COLL VENOUS BLD VENIPUNCTURE: CPT | Performed by: INTERNAL MEDICINE

## 2021-12-13 PROCEDURE — 250N000011 HC RX IP 250 OP 636

## 2021-12-13 PROCEDURE — 96374 THER/PROPH/DIAG INJ IV PUSH: CPT | Mod: 59

## 2021-12-13 PROCEDURE — 85025 COMPLETE CBC W/AUTO DIFF WBC: CPT | Performed by: EMERGENCY MEDICINE

## 2021-12-13 PROCEDURE — 87636 SARSCOV2 & INF A&B AMP PRB: CPT | Performed by: INTERNAL MEDICINE

## 2021-12-13 PROCEDURE — 99285 EMERGENCY DEPT VISIT HI MDM: CPT | Mod: 25

## 2021-12-13 PROCEDURE — 85379 FIBRIN DEGRADATION QUANT: CPT | Performed by: INTERNAL MEDICINE

## 2021-12-13 PROCEDURE — 258N000003 HC RX IP 258 OP 636

## 2021-12-13 PROCEDURE — 120N000001 HC R&B MED SURG/OB

## 2021-12-13 PROCEDURE — 82040 ASSAY OF SERUM ALBUMIN: CPT | Performed by: EMERGENCY MEDICINE

## 2021-12-13 PROCEDURE — 84484 ASSAY OF TROPONIN QUANT: CPT | Performed by: EMERGENCY MEDICINE

## 2021-12-13 PROCEDURE — 93005 ELECTROCARDIOGRAM TRACING: CPT | Performed by: INTERNAL MEDICINE

## 2021-12-13 PROCEDURE — 83880 ASSAY OF NATRIURETIC PEPTIDE: CPT | Performed by: INTERNAL MEDICINE

## 2021-12-13 RX ORDER — POLYETHYLENE GLYCOL 3350 17 G/17G
17 POWDER, FOR SOLUTION ORAL DAILY PRN
Status: DISCONTINUED | OUTPATIENT
Start: 2021-12-13 | End: 2021-12-21 | Stop reason: HOSPADM

## 2021-12-13 RX ORDER — PROCHLORPERAZINE 25 MG
25 SUPPOSITORY, RECTAL RECTAL EVERY 12 HOURS PRN
Status: DISCONTINUED | OUTPATIENT
Start: 2021-12-13 | End: 2021-12-21 | Stop reason: HOSPADM

## 2021-12-13 RX ORDER — IOPAMIDOL 755 MG/ML
100 INJECTION, SOLUTION INTRAVASCULAR ONCE
Status: COMPLETED | OUTPATIENT
Start: 2021-12-13 | End: 2021-12-13

## 2021-12-13 RX ORDER — NALOXONE HYDROCHLORIDE 0.4 MG/ML
0.2 INJECTION, SOLUTION INTRAMUSCULAR; INTRAVENOUS; SUBCUTANEOUS
Status: DISCONTINUED | OUTPATIENT
Start: 2021-12-13 | End: 2021-12-21 | Stop reason: HOSPADM

## 2021-12-13 RX ORDER — IOPAMIDOL 755 MG/ML
100 INJECTION, SOLUTION INTRAVASCULAR ONCE
Status: DISCONTINUED | OUTPATIENT
Start: 2021-12-13 | End: 2021-12-13

## 2021-12-13 RX ORDER — PROCHLORPERAZINE MALEATE 5 MG
5 TABLET ORAL EVERY 6 HOURS PRN
Status: DISCONTINUED | OUTPATIENT
Start: 2021-12-13 | End: 2021-12-21 | Stop reason: HOSPADM

## 2021-12-13 RX ORDER — NALOXONE HYDROCHLORIDE 0.4 MG/ML
0.4 INJECTION, SOLUTION INTRAMUSCULAR; INTRAVENOUS; SUBCUTANEOUS
Status: DISCONTINUED | OUTPATIENT
Start: 2021-12-13 | End: 2021-12-21 | Stop reason: HOSPADM

## 2021-12-13 RX ORDER — ONDANSETRON 4 MG/1
4 TABLET, ORALLY DISINTEGRATING ORAL EVERY 6 HOURS PRN
Status: DISCONTINUED | OUTPATIENT
Start: 2021-12-13 | End: 2021-12-13

## 2021-12-13 RX ORDER — LIDOCAINE 40 MG/G
CREAM TOPICAL
Status: DISCONTINUED | OUTPATIENT
Start: 2021-12-13 | End: 2021-12-21 | Stop reason: HOSPADM

## 2021-12-13 RX ORDER — NABUMETONE 500 MG/1
500 TABLET, FILM COATED ORAL 2 TIMES DAILY PRN
Status: DISCONTINUED | OUTPATIENT
Start: 2021-12-13 | End: 2021-12-17

## 2021-12-13 RX ORDER — ACETAMINOPHEN 325 MG/1
975 TABLET ORAL EVERY 8 HOURS
Status: DISCONTINUED | OUTPATIENT
Start: 2021-12-13 | End: 2021-12-21 | Stop reason: HOSPADM

## 2021-12-13 RX ORDER — ONDANSETRON 2 MG/ML
4 INJECTION INTRAMUSCULAR; INTRAVENOUS EVERY 6 HOURS PRN
Status: DISCONTINUED | OUTPATIENT
Start: 2021-12-13 | End: 2021-12-13

## 2021-12-13 RX ORDER — OXYCODONE HYDROCHLORIDE 5 MG/1
5 TABLET ORAL EVERY 4 HOURS PRN
Status: DISCONTINUED | OUTPATIENT
Start: 2021-12-13 | End: 2021-12-21 | Stop reason: HOSPADM

## 2021-12-13 RX ORDER — LORAZEPAM 2 MG/ML
0.5 INJECTION INTRAMUSCULAR ONCE
Status: COMPLETED | OUTPATIENT
Start: 2021-12-13 | End: 2021-12-13

## 2021-12-13 RX ADMIN — IOPAMIDOL 100 ML: 755 INJECTION, SOLUTION INTRAVENOUS at 17:31

## 2021-12-13 RX ADMIN — LORAZEPAM 0.5 MG: 2 INJECTION INTRAMUSCULAR; INTRAVENOUS at 16:32

## 2021-12-13 RX ADMIN — SODIUM CHLORIDE 500 ML: 9 INJECTION, SOLUTION INTRAVENOUS at 21:34

## 2021-12-13 RX ADMIN — ENOXAPARIN SODIUM 40 MG: 40 INJECTION SUBCUTANEOUS at 22:42

## 2021-12-13 ASSESSMENT — HEART SCORE
TROPONIN: LESS THAN OR EQUAL TO NORMAL LIMIT
ECG: NON-SPECIFIC REPOLARIZATION DISTURBANCE
HISTORY: SLIGHTLY SUSPICIOUS
AGE: 45-64
HEART SCORE: 3
RISK FACTORS: 1-2 RISK FACTORS

## 2021-12-13 ASSESSMENT — ACTIVITIES OF DAILY LIVING (ADL)
WEAR_GLASSES_OR_BLIND: YES
ADLS_ACUITY_SCORE: 5
ADLS_ACUITY_SCORE: 9
WALKING_OR_CLIMBING_STAIRS_DIFFICULTY: YES
ADLS_ACUITY_SCORE: 12
PATIENT_/_FAMILY_COMMUNICATION_STYLE: SPOKEN LANGUAGE (ENGLISH OR BILINGUAL)
DIFFICULTY_COMMUNICATING: NO
HEARING_DIFFICULTY_OR_DEAF: NO
ADLS_ACUITY_SCORE: 7
WALKING_OR_CLIMBING_STAIRS: AMBULATION DIFFICULTY, REQUIRES EQUIPMENT
DOING_ERRANDS_INDEPENDENTLY_DIFFICULTY: NO
WHICH_OF_THE_ABOVE_FUNCTIONAL_RISKS_HAD_A_RECENT_ONSET_OR_CHANGE?: FALL HISTORY
EQUIPMENT_CURRENTLY_USED_AT_HOME: WALKER, STANDARD
DIFFICULTY_EATING/SWALLOWING: NO
FALL_HISTORY_WITHIN_LAST_SIX_MONTHS: YES
NUMBER_OF_TIMES_PATIENT_HAS_FALLEN_WITHIN_LAST_SIX_MONTHS: 1
ADLS_ACUITY_SCORE: 9
CONCENTRATING,_REMEMBERING_OR_MAKING_DECISIONS_DIFFICULTY: NO
TOILETING_ISSUES: NO
DRESSING/BATHING_DIFFICULTY: NO

## 2021-12-13 ASSESSMENT — MIFFLIN-ST. JEOR
SCORE: 1855.02
SCORE: 1650.9

## 2021-12-13 NOTE — H&P
[facility]    History and Physical - [facility]        Date of Admission:  12/13/2021    Assessment & Plan      Dafne D Kenny is a 61 year old female with a history significant for falling, lumbar back injury, lumbar back pain, chronic leg swelling, hypertension, and alcohol use presenting for evaluation of generalized weakness and inability to get out of bed prior to arrival, admitted for failure to thrive and work-up of lower back pain.    Failure to thrive  Generalized weakness  Patient notes general weakness that waxes and wanes in her arms and legs over the past few months. She recently started working from home and reports gaining weight and being less active. She recently started using a walker. She was unable to get out of bed the morning of 12/13, and per paramedics report, appeared unkempt as well as her apartment. She also endorses problems with mood and becomes tearful when discussing her worries.   - PT/OT  - TSH, anemia work-up, liver disease?  - MR of lumbar and cervical spine    Hx fall in October 2021  Lumbar back pain  Patient reports chronic lower back pain for many years. Last October, while kicking clothes out of the way, she fell back onto her back and head. Work-up in the ED was unremarkable. XR of her back with moderate disc space narrowing at L4-L5 and mild-moderate facet arthropathy at L3-L4 and L4-L5. She was seen at the spine clinic on 12/02, recommended MR of cervical and lumbar spine (scheduled for 12/14).   - MR lumbar/cervical spine   - pain control: tylenol scheduled, oxycodone 5 mg q4h severe pain    Chronic peripheral edema  Patient does not report PND or orthopnea. BNP 11. CXR with normal size heart. Low suspicion for CHF. US lower extremity venous duplex negative for DVT. More likely dependent edema. Given elevated LFT's and history of increased alcohol use considering liver disease as a contributing factor as well.  - CMP    Shortness of  breath  This has also been going on since January. She notes it may be worse with activity, but it highly varies. No orthopnea, PND, palpitations, lightheadedness, or associated chest pain with the SOB. She notes it happens when she thinks of things that worry her. D-dimer elevated to 0.78. CXR normal. CT without PE and no signs of acute disease. US lower extremity venous duplex negative for DVT.    Splenic artery aneurysm  2.5 cm splenic artery aneurysm found on CT of chest. Recommended IR or vascular consult.  - IR consult    Hyponatremia  Sodium 130 on admission. Likely hypovolemic given patient has not been eating or drinking as much.   - 500 bolus NS with recheck after    Prolonged QTc  Qtc 539 on admission.  - avoid QT prolonging medications    Elevated LFT's  Heavy alcohol use  Patient has a history of drinking 4-5 standard drinks a day. CMP shows elevated AST, ALT, bilirubin, and alk phos. Albumin mildly low 3.3. INR normal. May be contributing to weakness presentation.  - monitor CMP  - consider further work-up     Diet: Combination Diet Regular Diet Adult  DVT Prophylaxis: Enoxaparin (Lovenox) SQ  Fermin Catheter: Not present  Fluids: None  Central Lines: None  Code Status: No CPR- Do NOT Intubate    Disposition Plan   Expected Discharge: pending safe disposition and control of back pain      Julienne Phelan MD  [facility]  Securely message with the Vocera Web Console (learn more here)  Text page via [facility] Paging/Directory      ______________________________________________________________________    Chief Complaint   Weakness, inability to get out of bed, back pain    History is obtained from the patient    History of Present Illness   Autumn D Kenny is a 61 year old female with a history significant for falling, lumbar back injury, lumbar back pain, chronic leg swelling, and hypertension, presenting for evaluation of generalized weakness and inability to get  out of bed earlier this morning.    Patient describes being able to walk long distances this past summer without difficulty. Sometime in October, she was kicking some clothes to the side in her room when she fell straight onto her back and hit her head. She cannot recall if she lost consciousness or not. Shortly after, she was evaluated in the ED. At that time, head CT negative for acute pathology, XR of back with moderate disc space narrowing at L4-L5 and mild-moderate facet arthropathy at L3-L4 and L4-L5. She was referred to the spine clinic.     Patient was seen in the spine clinic on 12/02. MR lumbar and cervical spine was ordered. She was started on relafen.     Today, per ED note, she could not get out of bed and the paramedics evaluated her. She was found to be unkempt in her apartment. She required assistance.     Generalized weakness- She reports weakness in all extremities for the past few months. She says she has gained some weight since starting to work from home and feels less motivated to move. She also mentions several times having problems with mood. Over all, the weakness waxes and wanes. She uses a walker when ambulating.     Back pain- This has been fairly chronic in nature; although, after her fall this pain has progressed. No radiculopathy. Worse when she bends over and towards the end of the day. She has tried exercises and stretches which help. She reports no saddle anesthesia, bladder or bowel continence, or lower extremity numbness/paresthesia.     Shortness of breath- This has also been going on since January. She notes it may be worse with activity, but it highly varies. No orthopnea, PND, palpitations, lightheadedness, or associated chest pain with the SOB. She notes that when she starts to think about things that worry her, she becomes more short of breath.     Peripheral edema- started last year and progressively got worse as she started working from home. Denies acute pain in her legs.  No heart failure symptoms as described above.    Review of Systems    The 10 point Review of Systems is negative other than noted in the HPI or here.     Past Medical History    I have reviewed this patient's medical history and updated it with pertinent information if needed.   No past medical history on file.     Past Surgical History   I have reviewed this patient's surgical history and updated it with pertinent information if needed.  No past surgical history on file.     Social History   She lives near  in an apartment, alone. She smoked tobacco for ten years, then quit about 15-20 years ago. She drinks 4-5 glasses of wine/beer a day. She does not have a history of withdrawal or seizures. She does not use recreational drugs. She has not seen a doctor in many years and does not have a PCP.     Family History   I have reviewed this patient's family history and updated it with pertinent information if needed.  Family History   Problem Relation Age of Onset     Cancer Mother      Cancer Father        Prior to Admission Medications   Prior to Admission Medications   Prescriptions Last Dose Informant Patient Reported? Taking?   magnesium 250 MG tablet 12/13/2021 at am  No Yes   Sig: Take 1 tablet (250 mg) by mouth daily   nabumetone (RELAFEN) 500 MG tablet Past Week  No Yes   Sig: Take 1 tablet (500 mg) by mouth 2 times daily as needed for moderate pain   oxyCODONE (ROXICODONE) 5 MG tablet Past Week  No Yes   Sig: Take 1 tablet (5 mg) by mouth every 6 hours as needed for pain      Facility-Administered Medications: None     Allergies   No Known Allergies    Physical Exam   Vital Signs: Temp: 97.7  F (36.5  C) Temp src: Oral BP: (!) 162/75 Pulse: 103   Resp: 18 SpO2: 100 % O2 Device: None (Room air)    Weight: 230 lbs 0 oz    Constitutional: awake, alert, cooperative, no apparent distress, and appears stated age  Eyes: Lids and lashes normal, pupils equal, round and reactive to light, extra ocular muscles intact,  sclera clear, conjunctiva normal  ENT: normocepalic, without obvious abnormality  Respiratory: No increased work of breathing, clear to auscultation bilaterally, no crackles or wheezing  Cardiovascular: regular rate and rhythm and no murmur noted  GI: normal bowel sounds, non-distended and non-tender  Skin: Ulcer present on right anterior lower leg, 3 x 3 cm with overlying eschar, no drainage or surrounding erythema.   Musculoskeletal: 1+ petting edema lower extremities to knees, venous stasis noted. No TTP over the spine.   Neurologic: Awake, alert, oriented to name, place and time.  Cranial nerves II-XII are grossly intact.  Weakness noted in hand  and leg raise bilaterally.  Neuropsychiatric: General: normal, calm and normal eye contact  Affect: tearful and sad    Data   Data reviewed today: I reviewed all medications, new labs and imaging results over the last 24 hours. I personally reviewed images and EKG.    Recent Labs   Lab 12/13/21  1319 12/13/21  1318   WBC 10.0  --    HGB 11.6*  --    MCV 91  --      --    INR  --  1.11   *  --    POTASSIUM 3.5  --    CHLORIDE 93*  --    CO2 20*  --    BUN 7*  --    CR 0.61  --    ANIONGAP 17  --    LEXI 9.9  --    GLC 95  --    ALBUMIN 3.3*  --    PROTTOTAL 8.1*  --    BILITOTAL 1.6*  --    ALKPHOS 144*  --    ALT 49*  --    AST 41*  --      Recent Results (from the past 24 hour(s))   XR Chest Port 1 View    Narrative    EXAM: XR CHEST PORT 1 VIEW  LOCATION: [facility]  DATE/TIME: 12/13/2021 3:34 PM    INDICATION: Shortness of breath  COMPARISON: 11/16/2021      Impression    IMPRESSION: Lungs are clear. Heart and pulmonary vascularity are normal. No signs of acute disease.   CT Chest Pulmonary Embolism w Contrast    Narrative    EXAM: CT CHEST PULMONARY EMBOLISM W CONTRAST  LOCATION: [facility]  DATE/TIME: 12/13/2021 5:20 PM    INDICATION: PE suspected, low/intermediate prob, positive D-dimer  COMPARISON:  None.  TECHNIQUE: CT chest pulmonary angiogram during arterial phase injection of IV contrast. Multiplanar reformats and MIP reconstructions were performed. Dose reduction techniques were used.   CONTRAST: isovue 370 100ml    FINDINGS:  ANGIOGRAM CHEST: Pulmonary arteries are normal caliber and negative for pulmonary emboli. Thoracic aorta is negative for dissection. No CT evidence of right heart strain.    LUNGS AND PLEURA: Trace dependent atelectasis. No pleural effusion. No suspicious nodule.    MEDIASTINUM/AXILLAE: Normal.    CORONARY ARTERY CALCIFICATION: None.    UPPER ABDOMEN: Hepatic steatosis. A 2.5 x 2.0 cm splenic artery aneurysm, image 131:5. No evidence of impending rupture.    MUSCULOSKELETAL: Mild degenerative changes of the spine.      Impression    IMPRESSION:  1.  No pulmonary embolus.  2.  A 2.5 cm splenic artery aneurysm. Recommend interventional radiology or vascular surgery consultation.   US Lower Extremity Venous Duplex Bilateral    Narrative    EXAM: US LOWER EXTREMITY VENOUS DUPLEX BILATERAL  LOCATION: [facility]  DATE/TIME: 12/13/2021 5:20 PM    INDICATION: Elevated D-Dimer, swelling  COMPARISON: None.  TECHNIQUE: Venous Duplex ultrasound of bilateral lower extremities with and without compression, augmentation and duplex. Color flow and spectral Doppler with waveform analysis performed.    FINDINGS: Exam includes the common femoral, femoral, popliteal veins as well as segmentally visualized deep calf veins and greater saphenous vein.     RIGHT: No deep vein thrombosis. No superficial thrombophlebitis. No popliteal cyst.    LEFT: No deep vein thrombosis. No superficial thrombophlebitis. No popliteal cyst.      Impression    IMPRESSION:  1.  No deep venous thrombosis in the bilateral lower extremities.

## 2021-12-13 NOTE — PHARMACY-ADMISSION MEDICATION HISTORY
Pharmacy Note - Admission Medication History     ______________________________________________________________________    Prior To Admission (PTA) med list completed and updated in EMR.       PTA Med List   Medication Sig Last Dose     magnesium 250 MG tablet Take 1 tablet (250 mg) by mouth daily 12/13/2021 at am     nabumetone (RELAFEN) 500 MG tablet Take 1 tablet (500 mg) by mouth 2 times daily as needed for moderate pain Past Week     oxyCODONE (ROXICODONE) 5 MG tablet Take 1 tablet (5 mg) by mouth every 6 hours as needed for pain Past Week       Information source(s): Patient and Clinic records  Method of interview communication: phone    Summary of Changes to PTA Med List  New: none  Discontinued: furosemide   Changed: none    Patient was asked about OTC/herbal products specifically.  PTA med list reflects this.    In the past week, patient estimated taking medication this percent of the time:  greater than 90%.    Allergies were reviewed, assessed, and updated with the patient.      Patient does not use any multi-dose medications prior to admission.    The information provided in this note is only as accurate as the sources available at the time of the update(s).    Thank you for the opportunity to participate in the care of this patient.    Alannah River RPH  12/13/2021 5:50 PM

## 2021-12-13 NOTE — ED TRIAGE NOTES
Pt lives by herself.  She has had increasing generalized weakness with falls.  Today she could not get her self out of bed so she called medics. Medics report apartment smelled of urine, pt appears unkept, using a walker at home.

## 2021-12-13 NOTE — ED PROVIDER NOTES
EMERGENCY DEPARTMENT ENCOUNTER      NAME: Dafne Cox  AGE: 61 year old female  YOB: 1960  MRN: [id number]  EVALUATION DATE & TIME: 12/13/2021  1:59 PM    PCP: No Ref-Primary, Physician    ED PROVIDER: Arnol Lazo M.D.      Chief Complaint   Patient presents with     Generalized Weakness           =================================================================    HPI    Patient information was obtained from: Herself and paramedics charting    Dafne Cox is a 61 year old female with a pertinent history of falling, lumbar back injury, lumbar back pain, chronic leg swelling, hypertension who presents to this ED for evaluation of weakness and inability to get out of her bed today and ongoing accelerating above-mentioned symptoms.  Patient reports that she was walking long distances last summer without assistance and had no leg swelling. Patient states she was required to work from home this fall and when doing so began noting some leg swelling. Patient apparently fell sometime in October striking her back and symptoms had acute lumbar back pain and significant bilateral leg weakness. Patient states weakness waxes and wanes and prevents her from walking long distances or performing activities. Patient notes along with leg swelling she has had some intermittent shortness of breath and this is been present since last fall as well with gradual onset over the fall. Patient notes in October that she struck her anterior right lower leg and developed an ulceration there which she treated with peroxide which has not completely healed though was significantly more inflamed in the past. Patient reports no acute pain in her legs at this time denies severe calf pains though states swelling is gradually getting worse in both legs to the point where it is impairing her ambulation. Patient over the last few weeks has been using a walker which she found to be significantly helpful in ambulation. She apparently  could not get out of bed today and paramedics evaluated her. She was found to be unkempt in her apartment was also somewhat unkempt, patient was unable to get up without assistance. She was determined by paramedics to be at risk for falling and is transported. She denies new incontinence of urine or stool. Patient reports chronic bilaterality of leg weakness and also reports occasionally feeling as if her arms are weak though she is without neck pain or upper back pain and this is not a common symptom. Patient reports no significant headache or double vision or speech problems. She does note chronic lumbar back pain which is currently under evaluation she was scheduled for MRI tomorrow. This pain has been present since her fall in October. Patient does have a history of chronic hypertension which she states is relatively poorly controlled and she is not taking care of her self at home from the standpoint of mobility, consistent evaluations or medication. She notes weight gain over the fall due to the lack of ability to ambulate and exercise.      REVIEW OF SYSTEMS   Review of Systems   Constitutional: Denies fever, Chills, weight loss, notes weight gain as above several pounds  HENT: Without sore throat, trouble swallowing, denies earache, vertigo  Eyes:  Denies double vision, new visual disturbance, eye pain  Respiratory: Denies cough, shortness of breath at rest, pleuritic chest pain, notes progressive dyspnea on exertion over the last several weeks.  Cardiovascular: Denies chest pain, arm/shoulder pain, anterior neck pain  GI:Denies nausea, vomiting, diarrhea, bloody stool, black stool or black emesis, denies abdominal pain  : Denies frequency, dysuria, bloody urine, denies incontinence  Musculoskeletal: No new neck pain, no new back pain, patient reports chronic lumbar back pain as above, no recent change over the last few days, no new extremity pain or new joint swelling, no edema history as above with marked  "swelling in both lower extremities.  Integument: Denies new skin lesions, healing ulceration anterior right lower leg and also reports brown discoloration of her feet and legs over the last few weeks.  Neurologic: Denies current headache, speech disturbance, other neurologic history as in HPI.  Psychiatric: Denies new psychiatric symptoms, suicidal ideation or hallucinations. Reports anxiety    PAST MEDICAL HISTORY:  No past medical history on file.  Hypertension  History of alcohol use  History of elevated liver function tests  History of hypomagnesemia  History of dependent edema  PAST SURGICAL HISTORY:  No past surgical history on file.        CURRENT MEDICATIONS:    Patient did receive oxycodone in November  Furosemide in the past.  Magnesium supplementation in the past    ALLERGIES:  No Known Allergies    FAMILY HISTORY:  Family History   Problem Relation Age of Onset     Cancer Mother      Cancer Father        SOCIAL HISTORY:   Social History     Socioeconomic History     Marital status: Single     Spouse name: Not on file     Number of children: Not on file     Years of education: Not on file     Highest education level: Not on file   Occupational History     Not on file   Tobacco Use     Smoking status: Never Smoker     Smokeless tobacco: Never Used   Substance and Sexual Activity     Alcohol use: Yes     Drug use: Not on file     Sexual activity: Not on file   Other Topics Concern     Parent/sibling w/ CABG, MI or angioplasty before 65F 55M? Not Asked   Social History Narrative     Not on file     Social Determinants of Health     Financial Resource Strain: Not on file   Food Insecurity: Not on file   Transportation Needs: Not on file   Physical Activity: Not on file   Stress: Not on file   Social Connections: Not on file   Intimate Partner Violence: Not on file   Housing Stability: Not on file       VITALS:  BP (!) 184/111   Pulse 93   Temp 98  F (36.7  C) (Oral)   Resp 18   Ht 1.702 m (5' 7\")   Wt " 124.7 kg (275 lb)   SpO2 100%   BMI 43.07 kg/m      PHYSICAL EXAM    Constitutional: Well developed, Well nourished, NAD, GCS 15   HENT: Normocephalic, Atraumatic, Bilateral external ears normal, Oropharynx normal, mucous membranes moist, Nose normal. Neck-  Normal range of motion, No tenderness, Supple, No stridor.   Eyes: PERRL, EOMI, Conjunctiva normal, No discharge.   Respiratory: Normal breath sounds, does not take deep inspirations, no respiratory distress, No wheezing, Speaks full sentences easily. No cough.   Cardiovascular: Normal heart rate, Regular rhythm, No murmurs, No rubs, No gallops. Chest wall nontender.   GI: Moderate obesity. Bowel sounds normal, Soft, No tenderness, No masses, No flank tenderness. No rebound or guarding.    : No CVA tenderness  Musculoskeletal: 2+ DP pulses. 3+ edema both lower extremities below the knees onto the feet.. Venous stasis discoloration, No clubbing. Good range of motion in all major joints. No tenderness to palpation or major deformities noted. No tenderness of the CTLS spine. Decreased range of motion of lumbar spine due to discomfort  Integument: Warm, Dry, No erythema, No rash. No petechiae. Patient has an ulceration present approximately 6 cm above the ankle in the midline overlying the tibia, this is approximately 3 cm to 4 cm in diameter, it is covered with a eschar which appears chronic with minimal surrounding erythema and no tenderness associated with it. There is no purulence.  Lymphatic: Unremarkable  Neurologic: Alert & oriented x 3, Normal motor function, Normal sensory function bilateral hip extension weakness noted.mildly decreased DTRs at both knees.   Psychiatric: Affect normal, Judgment normal, Mood: Occasional tearful episodes.. Cooperative.      LAB:  All pertinent labs reviewed and interpreted.  Results for orders placed or performed during the hospital encounter of 12/13/21   XR Chest Port 1 View    Impression    IMPRESSION: Lungs are clear.  Heart and pulmonary vascularity are normal. No signs of acute disease.   US Lower Extremity Venous Duplex Bilateral    Impression    IMPRESSION:  1.  No deep venous thrombosis in the bilateral lower extremities.   CT Chest Pulmonary Embolism w Contrast    Impression    IMPRESSION:  1.  No pulmonary embolus.  2.  A 2.5 cm splenic artery aneurysm. Recommend interventional radiology or vascular surgery consultation.   Result Value Ref Range    INR 1.11 0.90 - 1.15   Comprehensive metabolic panel   Result Value Ref Range    Sodium 130 (L) 136 - 145 mmol/L    Potassium 3.5 3.5 - 5.0 mmol/L    Chloride 93 (L) 98 - 107 mmol/L    Carbon Dioxide (CO2) 20 (L) 22 - 31 mmol/L    Anion Gap 17 5 - 18 mmol/L    Urea Nitrogen 7 (L) 8 - 22 mg/dL    Creatinine 0.61 0.60 - 1.10 mg/dL    Calcium 9.9 8.5 - 10.5 mg/dL    Glucose 95 70 - 125 mg/dL    Alkaline Phosphatase 144 (H) 45 - 120 U/L    AST 41 (H) 0 - 40 U/L    ALT 49 (H) 0 - 45 U/L    Protein Total 8.1 (H) 6.0 - 8.0 g/dL    Albumin 3.3 (L) 3.5 - 5.0 g/dL    Bilirubin Total 1.6 (H) 0.0 - 1.0 mg/dL    GFR Estimate >90 >60 mL/min/1.73m2   Lactic acid whole blood   Result Value Ref Range    Lactic Acid 1.2 0.7 - 2.0 mmol/L   Result Value Ref Range    Troponin I <0.01 0.00 - 0.29 ng/mL   Result Value Ref Range    Magnesium 1.8 1.8 - 2.6 mg/dL   CBC with platelets and differential   Result Value Ref Range    WBC Count 10.0 4.0 - 11.0 10e3/uL    RBC Count 3.61 (L) 3.80 - 5.20 10e6/uL    Hemoglobin 11.6 (L) 11.7 - 15.7 g/dL    Hematocrit 33.0 (L) 35.0 - 47.0 %    MCV 91 78 - 100 fL    MCH 32.1 26.5 - 33.0 pg    MCHC 35.2 31.5 - 36.5 g/dL    RDW 13.8 10.0 - 15.0 %    Platelet Count 225 150 - 450 10e3/uL    % Neutrophils 80 %    % Lymphocytes 10 %    % Monocytes 6 %    % Eosinophils 1 %    % Basophils 1 %    % Immature Granulocytes 2 %    NRBCs per 100 WBC 0 <1 /100    Absolute Neutrophils 8.3 1.6 - 8.3 10e3/uL    Absolute Lymphocytes 1.0 0.8 - 5.3 10e3/uL    Absolute Monocytes 0.6 0.0 -  1.3 10e3/uL    Absolute Eosinophils 0.1 0.0 - 0.7 10e3/uL    Absolute Basophils 0.1 0.0 - 0.2 10e3/uL    Absolute Immature Granulocytes 0.2 <=0.4 10e3/uL    Absolute NRBCs 0.0 10e3/uL   D dimer quantitative   Result Value Ref Range    D-Dimer Quantitative 0.78 (H) 0.00 - 0.50 ug/mL FEU   Symptomatic Influenza A/B & SARS-CoV2 (COVID-19) Virus PCR Multiplex Nasopharyngeal    Specimen: Nasopharyngeal; Swab   Result Value Ref Range    Influenza A PCR Negative Negative    Influenza B PCR Negative Negative    SARS CoV2 PCR Negative Negative   B-Type Natriuretic Peptide ([facility] Only)   Result Value Ref Range    BNP 11 0 - 91 pg/mL       RADIOLOGY:  Reviewed all pertinent imaging. Please see official radiology reports as outlined above.      EKG:    Performed at: 1539  Impression: Partial bundle branch block, prolonged QT interval anterior T wave inversions  I have independently reviewed and interpreted the EKG(s) documented above.      FINAL IMPRESSION:  1. Dyspnea on exertion    2. Anasarca    3. Hypertension, unspecified type    4. Ulcer of right lower extremity, unspecified ulcer stage (H)    5. Nonspecific ST-T wave electrocardiographic changes    6. Prolonged QT interval    7. Hyponatremia    8. Muscle weakness (generalized)    9. Failure to thrive in adult    10. At risk for falling    11. Splenic artery aneurysm (H)    12. Hypoalbuminemia          ED COURSE & MEDICAL DECISION MAKING:    Pertinent Labs & Imaging studies reviewed. (See chart for details)  61 year old female presents to the Emergency Department for evaluation of worsening dyspnea on exertion, shortness of breath at rest occasionally, worsening anasarca both lower extremities.  Patient is unable to care for self at home recently and also has a bilateral leg weakness that has progressed since a fall where she struck her lumbar spine approximately 1 month ago.  Medics did not feel patient could continue to take care of her self at home and she was unable  to get out of bed today.  Evaluation emergency room revealed significant anasarca both lower extremities and significant hip extensor weakness and inability to ambulate.  In addition patient's D-dimer is elevated today with a CTA pending at this time.  Given subacute onset of patient's symptoms and hemodynamic stability I will wait to see the results of Duplex/CTA before determining heparin status.  Troponin is negative.  Patient will be admitted given her inability to care for herself at this time.  Case was discussed with the hospitalist to assess patient for medical abdomen.    1748:  CTA was negative for PE, patient does have a splenic artery aneurysm.  After Ativan patient's systolic blood pressure decreased into the 130s, patient was not treated for high blood pressure in the emergency department  At this point there is no sign of congestive heart failure patient does have elevated blood pressure and decreased albumin with Starling forces leading to increasing edema.  Immobility may be related to patient's back complaints and this will be evaluated after admission.  MRI is pending  At the conclusion of the encounter I discussed the results of all of the tests and the disposition. The questions were answered. The patient or family acknowledged understanding and was agreeable with the care plan.   Patient's heart score was 3, cause of shortness of breath remains uncertain and patient was not short of breath at the time of admission, the further evaluation tonight will focus on cause of leg weakness and back pain.  Patient will remain n.p.o. until that status is reevaluated by the admitting physician for.    MEDICATIONS GIVEN IN THE EMERGENCY:  Medications   LORazepam (ATIVAN) injection 0.5 mg (0.5 mg Intravenous Given 12/13/21 1632)   iopamidol (ISOVUE-370) solution 100 mL (100 mLs Intravenous Given 12/13/21 1731)             Arnol Lazo M.D.  Emergency Medicine  [facility]  [facility] EMERGENCY DEPARTMENT  [address] [place] [id number]  [phone]  Dept: [phone]     Arnol Lazo MD  12/13/21 1811       Arnol Lazo MD  12/13/21 1815       Arnol Lazo MD  12/15/21 1928

## 2021-12-14 ENCOUNTER — APPOINTMENT (OUTPATIENT)
Dept: MRI IMAGING | Facility: HOSPITAL | Age: 61
DRG: 640 | End: 2021-12-14
Payer: COMMERCIAL

## 2021-12-14 ENCOUNTER — APPOINTMENT (OUTPATIENT)
Dept: OCCUPATIONAL THERAPY | Facility: HOSPITAL | Age: 61
DRG: 640 | End: 2021-12-14
Payer: COMMERCIAL

## 2021-12-14 ENCOUNTER — APPOINTMENT (OUTPATIENT)
Dept: PHYSICAL THERAPY | Facility: HOSPITAL | Age: 61
DRG: 640 | End: 2021-12-14
Payer: COMMERCIAL

## 2021-12-14 LAB
ALBUMIN SERPL-MCNC: 2.4 G/DL (ref 3.5–5)
ALP SERPL-CCNC: 101 U/L (ref 45–120)
ALT SERPL W P-5'-P-CCNC: 35 U/L (ref 0–45)
ANION GAP SERPL CALCULATED.3IONS-SCNC: 13 MMOL/L (ref 5–18)
AST SERPL W P-5'-P-CCNC: 36 U/L (ref 0–40)
ATRIAL RATE - MUSE: 93 BPM
BILIRUB SERPL-MCNC: 0.9 MG/DL (ref 0–1)
BUN SERPL-MCNC: 8 MG/DL (ref 8–22)
CALCIUM SERPL-MCNC: 8.7 MG/DL (ref 8.5–10.5)
CHLORIDE BLD-SCNC: 102 MMOL/L (ref 98–107)
CO2 SERPL-SCNC: 21 MMOL/L (ref 22–31)
CREAT SERPL-MCNC: 0.6 MG/DL (ref 0.6–1.1)
DIASTOLIC BLOOD PRESSURE - MUSE: 78 MMHG
ERYTHROCYTE [DISTWIDTH] IN BLOOD BY AUTOMATED COUNT: 14 % (ref 10–15)
FERRITIN SERPL-MCNC: 877 NG/ML (ref 10–130)
GFR SERPL CREATININE-BSD FRML MDRD: >90 ML/MIN/1.73M2
GLUCOSE BLD-MCNC: 92 MG/DL (ref 70–125)
HCT VFR BLD AUTO: 28.4 % (ref 35–47)
HGB BLD-MCNC: 9.6 G/DL (ref 11.7–15.7)
INTERPRETATION ECG - MUSE: NORMAL
MAGNESIUM SERPL-MCNC: 1.8 MG/DL (ref 1.8–2.6)
MCH RBC QN AUTO: 31.9 PG (ref 26.5–33)
MCHC RBC AUTO-ENTMCNC: 33.8 G/DL (ref 31.5–36.5)
MCV RBC AUTO: 94 FL (ref 78–100)
P AXIS - MUSE: 60 DEGREES
PHOSPHATE SERPL-MCNC: 3.8 MG/DL (ref 2.5–4.5)
PLATELET # BLD AUTO: 166 10E3/UL (ref 150–450)
POTASSIUM BLD-SCNC: 3.2 MMOL/L (ref 3.5–5)
POTASSIUM BLD-SCNC: 3.2 MMOL/L (ref 3.5–5)
POTASSIUM BLD-SCNC: 3.4 MMOL/L (ref 3.5–5)
PR INTERVAL - MUSE: 172 MS
PROT SERPL-MCNC: 6 G/DL (ref 6–8)
QRS DURATION - MUSE: 88 MS
QT - MUSE: 434 MS
QTC - MUSE: 539 MS
R AXIS - MUSE: -21 DEGREES
RBC # BLD AUTO: 3.01 10E6/UL (ref 3.8–5.2)
SODIUM SERPL-SCNC: 135 MMOL/L (ref 136–145)
SODIUM SERPL-SCNC: 136 MMOL/L (ref 136–145)
SYSTOLIC BLOOD PRESSURE - MUSE: 165 MMHG
T AXIS - MUSE: 62 DEGREES
TOTAL PROTEIN SERUM FOR ELP: 6.5 G/DL (ref 6–8)
TSH SERPL DL<=0.005 MIU/L-ACNC: 3.85 UIU/ML (ref 0.3–5)
VENTRICULAR RATE- MUSE: 93 BPM
VIT B12 SERPL-MCNC: 979 PG/ML (ref 213–816)
WBC # BLD AUTO: 5.5 10E3/UL (ref 4–11)

## 2021-12-14 PROCEDURE — 82607 VITAMIN B-12: CPT | Performed by: STUDENT IN AN ORGANIZED HEALTH CARE EDUCATION/TRAINING PROGRAM

## 2021-12-14 PROCEDURE — 84100 ASSAY OF PHOSPHORUS: CPT | Performed by: STUDENT IN AN ORGANIZED HEALTH CARE EDUCATION/TRAINING PROGRAM

## 2021-12-14 PROCEDURE — 97162 PT EVAL MOD COMPLEX 30 MIN: CPT | Mod: GP

## 2021-12-14 PROCEDURE — 85027 COMPLETE CBC AUTOMATED: CPT

## 2021-12-14 PROCEDURE — 99223 1ST HOSP IP/OBS HIGH 75: CPT | Mod: AI

## 2021-12-14 PROCEDURE — 36415 COLL VENOUS BLD VENIPUNCTURE: CPT | Performed by: FAMILY MEDICINE

## 2021-12-14 PROCEDURE — 97530 THERAPEUTIC ACTIVITIES: CPT | Mod: GP

## 2021-12-14 PROCEDURE — 250N000013 HC RX MED GY IP 250 OP 250 PS 637: Performed by: STUDENT IN AN ORGANIZED HEALTH CARE EDUCATION/TRAINING PROGRAM

## 2021-12-14 PROCEDURE — 258N000003 HC RX IP 258 OP 636: Performed by: FAMILY MEDICINE

## 2021-12-14 PROCEDURE — 36415 COLL VENOUS BLD VENIPUNCTURE: CPT

## 2021-12-14 PROCEDURE — 82040 ASSAY OF SERUM ALBUMIN: CPT

## 2021-12-14 PROCEDURE — 97166 OT EVAL MOD COMPLEX 45 MIN: CPT | Mod: GO

## 2021-12-14 PROCEDURE — 84165 PROTEIN E-PHORESIS SERUM: CPT | Mod: TC | Performed by: STUDENT IN AN ORGANIZED HEALTH CARE EDUCATION/TRAINING PROGRAM

## 2021-12-14 PROCEDURE — 255N000002 HC RX 255 OP 636: Performed by: FAMILY MEDICINE

## 2021-12-14 PROCEDURE — 82728 ASSAY OF FERRITIN: CPT | Performed by: STUDENT IN AN ORGANIZED HEALTH CARE EDUCATION/TRAINING PROGRAM

## 2021-12-14 PROCEDURE — 72156 MRI NECK SPINE W/O & W/DYE: CPT

## 2021-12-14 PROCEDURE — 87086 URINE CULTURE/COLONY COUNT: CPT | Performed by: STUDENT IN AN ORGANIZED HEALTH CARE EDUCATION/TRAINING PROGRAM

## 2021-12-14 PROCEDURE — 81001 URINALYSIS AUTO W/SCOPE: CPT | Performed by: STUDENT IN AN ORGANIZED HEALTH CARE EDUCATION/TRAINING PROGRAM

## 2021-12-14 PROCEDURE — 36415 COLL VENOUS BLD VENIPUNCTURE: CPT | Performed by: STUDENT IN AN ORGANIZED HEALTH CARE EDUCATION/TRAINING PROGRAM

## 2021-12-14 PROCEDURE — 83735 ASSAY OF MAGNESIUM: CPT | Performed by: STUDENT IN AN ORGANIZED HEALTH CARE EDUCATION/TRAINING PROGRAM

## 2021-12-14 PROCEDURE — 84443 ASSAY THYROID STIM HORMONE: CPT | Performed by: STUDENT IN AN ORGANIZED HEALTH CARE EDUCATION/TRAINING PROGRAM

## 2021-12-14 PROCEDURE — 84132 ASSAY OF SERUM POTASSIUM: CPT | Performed by: STUDENT IN AN ORGANIZED HEALTH CARE EDUCATION/TRAINING PROGRAM

## 2021-12-14 PROCEDURE — 86334 IMMUNOFIX E-PHORESIS SERUM: CPT | Mod: TC | Performed by: STUDENT IN AN ORGANIZED HEALTH CARE EDUCATION/TRAINING PROGRAM

## 2021-12-14 PROCEDURE — 84132 ASSAY OF SERUM POTASSIUM: CPT | Performed by: FAMILY MEDICINE

## 2021-12-14 PROCEDURE — 120N000001 HC R&B MED SURG/OB

## 2021-12-14 PROCEDURE — 84295 ASSAY OF SERUM SODIUM: CPT

## 2021-12-14 PROCEDURE — A9585 GADOBUTROL INJECTION: HCPCS | Performed by: FAMILY MEDICINE

## 2021-12-14 PROCEDURE — 84155 ASSAY OF PROTEIN SERUM: CPT | Performed by: STUDENT IN AN ORGANIZED HEALTH CARE EDUCATION/TRAINING PROGRAM

## 2021-12-14 PROCEDURE — 250N000013 HC RX MED GY IP 250 OP 250 PS 637

## 2021-12-14 PROCEDURE — 250N000011 HC RX IP 250 OP 636: Performed by: STUDENT IN AN ORGANIZED HEALTH CARE EDUCATION/TRAINING PROGRAM

## 2021-12-14 PROCEDURE — 72158 MRI LUMBAR SPINE W/O & W/DYE: CPT

## 2021-12-14 PROCEDURE — 97535 SELF CARE MNGMENT TRAINING: CPT | Mod: GO

## 2021-12-14 PROCEDURE — 250N000011 HC RX IP 250 OP 636: Performed by: FAMILY MEDICINE

## 2021-12-14 RX ORDER — MULTIVITAMIN,THERAPEUTIC
TABLET ORAL DAILY
Status: DISCONTINUED | OUTPATIENT
Start: 2021-12-14 | End: 2021-12-21 | Stop reason: HOSPADM

## 2021-12-14 RX ORDER — GADOBUTROL 604.72 MG/ML
10 INJECTION INTRAVENOUS ONCE
Status: COMPLETED | OUTPATIENT
Start: 2021-12-14 | End: 2021-12-14

## 2021-12-14 RX ORDER — FOLIC ACID 1 MG/1
1 TABLET ORAL DAILY
Status: DISCONTINUED | OUTPATIENT
Start: 2021-12-14 | End: 2021-12-21 | Stop reason: HOSPADM

## 2021-12-14 RX ORDER — POTASSIUM CHLORIDE 1500 MG/1
40 TABLET, EXTENDED RELEASE ORAL ONCE
Status: COMPLETED | OUTPATIENT
Start: 2021-12-14 | End: 2021-12-14

## 2021-12-14 RX ADMIN — POTASSIUM CHLORIDE 40 MEQ: 1500 TABLET, EXTENDED RELEASE ORAL at 14:12

## 2021-12-14 RX ADMIN — POTASSIUM CHLORIDE 40 MEQ: 1500 TABLET, EXTENDED RELEASE ORAL at 20:27

## 2021-12-14 RX ADMIN — FOLIC ACID 1 MG: 1 TABLET ORAL at 14:12

## 2021-12-14 RX ADMIN — GADOBUTROL 10 ML: 604.72 INJECTION INTRAVENOUS at 13:06

## 2021-12-14 RX ADMIN — THIAMINE HYDROCHLORIDE 500 MG: 100 INJECTION, SOLUTION INTRAMUSCULAR; INTRAVENOUS at 17:25

## 2021-12-14 RX ADMIN — ACETAMINOPHEN 975 MG: 325 TABLET ORAL at 06:18

## 2021-12-14 RX ADMIN — ACETAMINOPHEN 975 MG: 325 TABLET ORAL at 14:13

## 2021-12-14 RX ADMIN — ENOXAPARIN SODIUM 40 MG: 40 INJECTION SUBCUTANEOUS at 23:09

## 2021-12-14 RX ADMIN — THIAMINE HYDROCHLORIDE 500 MG: 100 INJECTION, SOLUTION INTRAMUSCULAR; INTRAVENOUS at 20:25

## 2021-12-14 RX ADMIN — THERA TABS 1 TABLET: TAB at 14:12

## 2021-12-14 RX ADMIN — ACETAMINOPHEN 975 MG: 325 TABLET ORAL at 20:31

## 2021-12-14 ASSESSMENT — ACTIVITIES OF DAILY LIVING (ADL)
ADLS_ACUITY_SCORE: 15
ADLS_ACUITY_SCORE: 11
ADLS_ACUITY_SCORE: 19
ADLS_ACUITY_SCORE: 11
ADLS_ACUITY_SCORE: 19
ADLS_ACUITY_SCORE: 19
ADLS_ACUITY_SCORE: 11
ADLS_ACUITY_SCORE: 15
ADLS_ACUITY_SCORE: 19
ADLS_ACUITY_SCORE: 15
ADLS_ACUITY_SCORE: 19

## 2021-12-14 ASSESSMENT — MIFFLIN-ST. JEOR: SCORE: 1640.92

## 2021-12-14 NOTE — CONSULTS
Interventional Radiology - Consultation Note:  Inpatient - [facility]: Interventional Radiology   [phone]  12/14/2021    Reason for Consult:  Splenic aneurysm    Requesting Provider:  Julienne Phelan MD     HPI:  Dafne Cox is a 61 year old female with a history significant for falling, lumbar back injury, lumbar back pain, chronic leg swelling, hypertension, and alcohol use presenting for evaluation of generalized weakness and inability to get out of bed prior to arrival, admitted for failure to thrive and work-up of lower back pain. CT of chest was obtained and demonstrated a 2.5cm splenic aneurysm. IR consulted for further recommendations.     Patient denies abdominal pain, lightheadedness, fever, SOB. She has concerns about establishing primary care upon discharge.     IMAGING:    EXAM: CT CHEST PULMONARY EMBOLISM W CONTRAST  LOCATION: [facility]  DATE/TIME: 12/13/2021 5:20 PM     INDICATION: PE suspected, low/intermediate prob, positive D-dimer  COMPARISON: None.  TECHNIQUE: CT chest pulmonary angiogram during arterial phase injection of IV contrast. Multiplanar reformats and MIP reconstructions were performed. Dose reduction techniques were used.   CONTRAST: isovue 370 100ml     FINDINGS:  ANGIOGRAM CHEST: Pulmonary arteries are normal caliber and negative for pulmonary emboli. Thoracic aorta is negative for dissection. No CT evidence of right heart strain.     LUNGS AND PLEURA: Trace dependent atelectasis. No pleural effusion. No suspicious nodule.     MEDIASTINUM/AXILLAE: Normal.     CORONARY ARTERY CALCIFICATION: None.     UPPER ABDOMEN: Hepatic steatosis. A 2.5 x 2.0 cm splenic artery aneurysm, image 131:5. No evidence of impending rupture.     MUSCULOSKELETAL: Mild degenerative changes of the spine.                                                                      IMPRESSION:  1.  No pulmonary embolus.  2.  A 2.5 cm splenic artery aneurysm. Recommend  interventional radiology or vascular surgery consultation.     NPO Status:  NA; no intervention at this time  Anticoagulation/Antiplatelets/Bleeding tendencies:  lovenox 40mg Q12H; last dose 12/13/21 at 2242  Antibiotics:  None for IR    REVIEW OF SYSTEMS:  A comprehensive 10-point review of systems was performed. All systems were reviewed and negative with exception to those reported in the HPI.     PAST MEDICAL HISTORY:  Patient Active Problem List    Diagnosis Date Noted     Muscle weakness (generalized) 12/13/2021     Priority: Medium     Anasarca 12/13/2021     Priority: Medium     Hyponatremia 12/13/2021     Priority: Medium     Hypoalbuminemia 12/13/2021     Priority: Medium     Splenic artery aneurysm (H) 12/13/2021     Priority: Medium     Dyspnea on exertion 12/13/2021     Priority: Medium     At risk for falling 12/13/2021     Priority: Medium     Prolonged QT interval 12/13/2021     Priority: Medium     Nonspecific ST-T wave electrocardiographic changes 12/13/2021     Priority: Medium     Failure to thrive in adult 12/13/2021     Priority: Medium     Ulcer of right lower extremity, unspecified ulcer stage (H) 12/13/2021     Priority: Medium     Hypertension, unspecified type 12/13/2021     Priority: Medium       PAST SURGICAL HISTORY:  No past surgical history on file.    ALLERGIES:  No Known Allergies     MEDICATIONS:  Current Facility-Administered Medications   Medication     acetaminophen (TYLENOL) tablet 975 mg     enoxaparin ANTICOAGULANT (LOVENOX) injection 40 mg     lidocaine (LMX4) cream     lidocaine 1 % 0.1-1 mL     melatonin tablet 3 mg     nabumetone (RELAFEN) tablet 500 mg     naloxone (NARCAN) injection 0.2 mg    Or     naloxone (NARCAN) injection 0.4 mg    Or     naloxone (NARCAN) injection 0.2 mg    Or     naloxone (NARCAN) injection 0.4 mg     oxyCODONE (ROXICODONE) tablet 5 mg     polyethylene glycol (MIRALAX) Packet 17 g     prochlorperazine (COMPAZINE) injection 5 mg    Or      "prochlorperazine (COMPAZINE) tablet 5 mg    Or     prochlorperazine (COMPAZINE) suppository 25 mg     sodium chloride (PF) 0.9% PF flush 3 mL     sodium chloride (PF) 0.9% PF flush 3 mL        LABS:  INR   Date Value Ref Range Status   12/13/2021 1.11 0.90 - 1.15 Final      Hemoglobin   Date Value Ref Range Status   12/14/2021 9.6 (L) 11.7 - 15.7 g/dL Final   ]  Platelet Count   Date Value Ref Range Status   12/14/2021 166 150 - 450 10e3/uL Final     Creatinine   Date Value Ref Range Status   12/13/2021 0.61 0.60 - 1.10 mg/dL Final     Potassium   Date Value Ref Range Status   12/13/2021 3.5 3.5 - 5.0 mmol/L Final         EXAM:  /59 (BP Location: Right arm)   Pulse 91   Temp 97.5  F (36.4  C) (Oral)   Resp 17   Ht 1.702 m (5' 7\")   Wt 104.3 kg (230 lb)   SpO2 99%   BMI 36.02 kg/m    General:  Stable.  In no acute distress.    Neuro:  A&O x 3. Moves all extremities equally.  Resp:  Lungs clear anterior to auscultation bilaterally.  Cardio:  S1S2 and reg, without murmur, clicks or rubs  Abdomen:  Soft, non-distended, non-tender, positive bowel sounds.  Skin:  Without excoriations, ecchymosis, erythema, lesions or open sores on abdomen.      ASSESSMENT:  Splenic aneurysm; stable; low risk for rupture.     PLAN:    Imaging and case reviewed with IR MD Dr. Chai North.   Patients has been found to have a calcified splenic aneurysm with low risk for rupture. Recommend splenic aneurysm embolization; can be done as IP if long hospital stay is anticipated, otherwise can be set up as an outpatient in the near future. Would need to have lovenox held for 24 hours. NPO at midnight. Discussed with patient; she would prefer to have procedure when she is feeling better and recovered from this hospitalization. Patient to follow up with Dr. North at [facility] clinic at the next availability; [facility] schedulers will be in contact with patient.    Case discussed with primary team at patient's bedside.       Thank you for kindly " for this consultation.     Total time spent on the date of the encounter is 50 minutes, including time spent counseling the patient, performing a medically appropriate evaluation, reviewing prior medical history, ordering medications and tests, documenting clinical information in the medical record, and communication of results.    Becky Augustin, LEYDA CNP  Interventional Radiology  [phone]    E/M codes for reference only:  43480

## 2021-12-14 NOTE — PROGRESS NOTES
"CLINICAL NUTRITION SERVICES - ASSESSMENT NOTE     Nutrition Prescription    RECOMMENDATIONS FOR MDs/PROVIDERS TO ORDER:  Monitor and replace k, mg, phos - at risk for refeeding syndrome  Folate, thiamine, multivitamin supplementation    Malnutrition Status:    Severe    Recommendations already ordered by Registered Dietitian (RD):      Future/Additional Recommendations:  Need for snack/supplements     REASON FOR ASSESSMENT  Dafne Cox is a/an 61 year old female assessed by the dietitian for Admission Nutrition Risk Screen for eating poorly with weight loss    Per chart, pt with failure to thrive - not eating/drinking much, generalized weakness (could not get out of bed at home), Fall 2021, lumbar back pain, chronic peripheral edema, sob, splenic artery aneurysm, hyponatremia, heavy alcohol use    NUTRITION HISTORY  NKFA  Pt reports she is trying to eat a healthier diet.  She is less active in the winter and eats.  In the summer she is much more active.    CURRENT NUTRITION ORDERS  Diet: Regular  Intake/Tolerance: Pt has a large breakfast on tray table and states she is very hungry.  RD visited briefly pt did not eat/drink yet.    LABS  Labs reviewed  K 3.2 L    MEDICATIONS  Medications reviewed    ANTHROPOMETRICS  Height: 170.2 cm (5' 7\")  Most Recent Weight: 104.3 kg (230 lb)  Down 16.3% in one month  IBW: 61.3 kg  BMI: Obesity Grade II BMI 35-39.9  (fluid accumulation)  Weight History:   Wt Readings from Last 3 Encounters:   12/13/21 104.3 kg (230 lb)   11/16/21 124.7 kg (275 lb)   09/14/21 124.7 kg (275 lb)     Pt reports her weight varies from 180 -190 lb and up    Dosing Weight: 72.1 kg  Adjusted BW    ASSESSED NUTRITION NEEDS  Estimated Energy Needs: 2200 kcals/day (30 kcal/kg)  Justification: Obese  Estimated Protein Needs: 72-87 grams protein/day (1 - 1.2 grams of pro/kg)  Justification: Repletion  Estimated Fluid Needs: 1800- 2200 mL/day (25 - 30 mL/kg), or per provider   Justification: Maintenance "     PHYSICAL FINDINGS  See malnutrition section below.  Skin: abrasion right shin  1-2+ edema bilateral extremities  Pt appears to have generalized edema - mild including face  GI: last BM 12/11 per pt     MALNUTRITION:  % Weight Loss:  > 5% in 1 month (severe malnutrition)  % Intake:  </= 75% for >/= 1 month (severe malnutrition)  Subcutaneous Fat Loss:  None noted  Muscle Loss:  None noted  Fluid Retention:  Moderate     Malnutrition Diagnosis: Severe malnutrition  In Context of:  Environmental or social circumstances    NUTRITION DIAGNOSIS  Malnutrition related to environmental and social circumstances as evidenced by >5% weight loss in one month estimated <=75% of energy needs for >= one month, fluid accumulation      INTERVENTIONS  Implementation  Pt understands it is important to eat healthy meals  Suggest monitor and replace K, Mg, Phos - pt at risk for re-feeding syndrome  Suggest thiamine, folate, daily multivitamin (heavy alcohol use per h&p)    Goals  Patient to consume % of nutritionally adequate meals three times per day, or the equivalent with supplements/snacks.    Electrolytes WDL     Monitoring/Evaluation  Progress toward goals will be monitored and evaluated per protocol.

## 2021-12-14 NOTE — PROGRESS NOTES
Progress Note    Subjective  Reports she is feeling well today. States she has had a real tough last year - boyfriend  a year ago, has been quite depressed. Reports drinking 3-4 drinks per day for many years. Some scant numbness/tingling in feet here and there. Some lumbar back pain. Rest of ROS negative. Is a bit tangential.    Objective    Vital signs in last 24 hours Temp:  [97.5  F (36.4  C)-97.8  F (36.6  C)] 97.5  F (36.4  C)  Pulse:  [] 91  Resp:  [16-37] 17  BP: (112-195)/() 112/59  SpO2:  [99 %-100 %] 99 %   Weight: [unfilled]    Intake/Output last 3 shift I/O last 3 completed shifts:  In: 3 [I.V.:3]  Out: -     Intake/Output this shift:No intake/output data recorded.    Physical Exam  General: alert, comfortable, sitting in chair eating breakfast  Chest: clear to auscultation  Cor: RRR, no murmurs, rubs, or gallops  Ext: 1+ pitting edema to knees bilaterally. 3cm in diameter scab on right anterior shin. Venous stasis changes bilaterally  Neuro: CN II-XII grossly intact. Strength 4/5 throughout, symmetric. Tangential round about answers to all questions    Mini cog score 1/5    Pertinent Labs and Pertinent Radiology   Lab Results: personally reviewed.   K 3.2  Protein 6.0, albumin 2.4  Hgb 9.6, MCV 94  TSH 3.85      Recent Labs   Lab 21  0700   WBC 5.5   HGB 9.6*   HCT 28.4*          Recent Labs   Lab 21  0700      CO2 21*   BUN 8   ALBUMIN 2.4*   ALKPHOS 101   ALT 35   AST 36         Assessment/Plan   Kenny is a 61 year old female with a history significant for falling, lumbar back injury, lumbar back pain, chronic leg swelling, hypertension, and alcohol use presenting for evaluation of generalized weakness and inability to get out of bed prior to arrival, admitted for failure to thrive and work-up of lower back pain.     Failure to thrive  Generalized weakness  Alcohol use disorder  Patient notes general weakness that waxes and wanes in her arms and legs  over the past few months. She recently started working from home and reports gaining weight and being less active. She recently started using a walker. She was unable to get out of bed the morning of 12/13, and per paramedics report, appeared unkempt as well as her apartment. She also endorses significant depression over the past year with losing her boyfriend and COVID, and becomes tearful when discussing her worries. Reporting 4-5 drinks per day for many years - LFTs reflective of chronic use, though INR and platelets normal so still has good synthetic function. Mini cog done 12/14 - score of 1/5. Differential includes thyroid pathology, B12 def, malignancy, spine pathology (stenosis?), multiple myeloma given gamma gap, depression, alcohol use disorder, intracranial pathology (chronic subdurals, NPH). TSH normal. CT head 11/16/21 normal.  - PT/OT  - MR of lumbar and cervical spine  - B12 level, ferritin  - Serum protein electrophoresis with immunofixation  - Check Mg and Phos and replace accordingly - at risk for refeeding  - Start thiamine, folic acid, multivitamin given chronic alcohol use     Hx fall in October 2021  Lumbar back pain  Patient reports chronic lower back pain for many years. Last October, while kicking clothes out of the way, she fell back onto her back and head. Work-up in the ED was unremarkable. XR of her back with moderate disc space narrowing at L4-L5 and mild-moderate facet arthropathy at L3-L4 and L4-L5. She was seen at the spine clinic on 12/02, recommended MR of cervical and lumbar spine (scheduled for 12/14).   - MR lumbar/cervical spine   - pain control: tylenol scheduled, oxycodone 5 mg q4h severe pain     Chronic peripheral edema  Patient does not report PND or orthopnea. BNP 11. CXR with normal size heart. Low suspicion for CHF. US lower extremity venous duplex negative for DVT. More likely dependent edema/venous stasis. Given elevated LFT's and history of increased alcohol use  considering liver disease as a contributing factor as well.  - CMP     Shortness of breath  This has also been going on since January. She notes it may be worse with activity, but it highly varies. No orthopnea, PND, palpitations, lightheadedness, or associated chest pain with the SOB. She notes it happens when she thinks of things that worry her. D-dimer elevated to 0.78. CXR normal. CT without PE and no signs of acute disease. US lower extremity venous duplex negative for DVT. Seems mostly deconditioning related.     Splenic artery aneurysm  2.5 cm splenic artery aneurysm found on CT of chest. Recommended IR or vascular consult.  - IR consult    Hypokalemia  In setting of poor PO.  -K replacement protocol     Prolonged QTc  Qtc 539 on admission.  - avoid QT prolonging medications           Diet: Combination Diet Regular Diet Adult  DVT Prophylaxis: Enoxaparin (Lovenox) SQ  Fermin Catheter: Not present  Fluids: None  Central Lines: None  Code Status: No CPR- Do NOT Intubate    Precepted patient with Dr. Ronal Durán MD - PGY2  [facility] [place] [facility]  P: [phone]

## 2021-12-14 NOTE — ED NOTES
"[facility] ED Handoff Report    ED Chief Complaint: Weakness     ED Diagnosis:  (R06.00) Dyspnea on exertion      (R60.1) Anasarca      (I10) Hypertension, unspecified type      (L97.919) Ulcer of right lower extremity, unspecified ulcer stage (H)      (R94.31) Nonspecific ST-T wave electrocardiographic changes      (R94.31) Prolonged QT interval      (E87.1) Hyponatremia      (M62.81) Muscle weakness (generalized)      (R62.7) Failure to thrive in adult      (Z91.81) At risk for falling      (I72.8) Splenic artery aneurysm (H)      (E88.09) Hypoalbuminemia      PMH:  No past medical history on file.     Code Status:  No Order     Falls Risk: Yes Band: Applied    Current Living Situation/Residence: lives alone     Elimination Status: Continent: Yes ,per pt report..    Activity Level: 2 assist    Patients Preferred Language:  English     Needed: No    Vital Signs:  /61   Pulse 93   Temp 98  F (36.7  C) (Oral)   Resp 20   Ht 1.702 m (5' 7\")   Wt 124.7 kg (275 lb)   SpO2 100%   BMI 43.07 kg/m       Cardiac Rhythm: BBB    Pain Score: 0/10    Is the Patient Confused:  No    Last Food or Drink: 12/13/21 at AM    Focused Assessment: Patient is a 62 yo female who has had a significant loss of strength, pt used to be able to ambulate with walker,and now can pivot with assist of 2. Patient also has had a history of splenic artery anyrism. History of leg swelling, and hypertension. Has a healing ulcer on right lower leg.    Tests Performed: Done: Labs and Imaging    Treatments Provided:  lorazepam    Family Dynamics/Concerns: No    Family Updated On Visitor Policy: No    Plan of Care Communicated to Family: No    Who Was Updated about Plan of Care: pt hs notified family about stay    Belongings Checklist Done and Signed by Patient: No    Covid: asymptomatic , negative    Additional Information: Pt needs education on wound care... PATIENT STILL NEEDS MRI    RN: Alexia Bailey   12/13/2021 7:38 " PM

## 2021-12-14 NOTE — PLAN OF CARE
Problem: Muscle Strength Impairment  Goal: Improved Muscle Strength  Outcome: Improving     Problem: Pain Acute  Goal: Acceptable Pain Control and Functional Ability  Outcome: Improving   Pt Axo, denies any pain. Bolus given and saline lock.

## 2021-12-14 NOTE — PLAN OF CARE
Problem: Muscle Strength Impairment  Goal: Improved Muscle Strength  12/14/2021 1317 by Jeanine Onofre, RN  Outcome: Declining  12/14/2021 1316 by Jeanine Onofre RN  Outcome: Declining     Problem: Confusion Acute  Goal: Optimal Cognitive Function  Outcome: Declining    Pt needs two assist and lots of encouragement to transfer. She feels weak, she feels anxious. She has no pain.   Pt is very pleasant to talk to, but she will repeat stories and not remember details she just told you. She is alert to self, time, situation, but does have some forgetfulness/confusion that is better noticed during conversation.

## 2021-12-14 NOTE — UTILIZATION REVIEW
Inpatient appropriate  Admission Status; Secondary Review Determination     Under the authority of the Utilization Management Committee, the utilization review process indicated a secondary review on the above patient. The review outcome is based on review of the medical records, discussions with staff, and applying clinical experience noted on the date of the review.     (x) Inpatient Status Appropriate - This patient's medical care is consistent with medical management for inpatient care and reasonable inpatient medical practice.     RATIONALE FOR DETERMINATION   61 years old female evaluated in the ED on December 13, 2021 for increasing generalized weakness, falls unable to get out of bed. EMS reported the apartment was unkempt and smelled of urine. Upon ED arrival, initial vital signs remarkable for blood pressure 143/103 and heart rate 106 bpm. Laboratory work-up remarkable for sodium 130, chloride 93, alk phos 144, ALT 49, AST 41, hemoglobin 11.6. Patient also complained of progressive shortness of breath since January 2021 and chronic back pain that exacerbated after a fall in October 2021. She was noted to have edema of lower extremity. CT chest was negative for PE but showed a 2.5 cm splenic artery aneurysm. Ultrasound lower extremities was negative for DVT. Patient requires further work-up of inability to ambulate and back pain with MRI of cervical and lumbar spine. Interventional radiology consulted for evaluation of splenic artery aneurysm.  At the time of admission with the information available to the attending physician more than 2 nights Hospital complex care was anticipated, based on patient risk of adverse outcome if treated as outpatient and complex care required. Inpatient admission is appropriate based on the Medicare guidelines.     This document was produced using voice recognition software     The information on this document is developed by the utilization review team in order for the  business office to ensure compliance. This only denotes the appropriateness of proper admission status and does not reflect the quality of care rendered.   The definitions of Inpatient Status and Observation Status used in making the determination above are those provided in the CMS Coverage Manual, Chapter 1 and Chapter 6, section 70.4.     Sincerely,     José Miguel Pickering MD  [facility]  Utilization Review Physician Advisor  Pager: [phone]

## 2021-12-14 NOTE — PLAN OF CARE
Problem: Muscle Strength Impairment  Goal: Improved Muscle Strength  Outcome: No Change     Problem: Adult Inpatient Plan of Care  Goal: Optimal Comfort and Wellbeing  Outcome: No Change   Pt reports feeling the same, feeling weaker than baseline. VSS. Some swelling BLE, abrasion right shin. Need to collect urine sample still. Slept well. No acute events overnight.

## 2021-12-15 ENCOUNTER — APPOINTMENT (OUTPATIENT)
Dept: PHYSICAL THERAPY | Facility: HOSPITAL | Age: 61
DRG: 640 | End: 2021-12-15
Payer: COMMERCIAL

## 2021-12-15 ENCOUNTER — APPOINTMENT (OUTPATIENT)
Dept: OCCUPATIONAL THERAPY | Facility: HOSPITAL | Age: 61
DRG: 640 | End: 2021-12-15
Payer: COMMERCIAL

## 2021-12-15 ENCOUNTER — APPOINTMENT (OUTPATIENT)
Dept: MRI IMAGING | Facility: HOSPITAL | Age: 61
End: 2021-12-15
Attending: FAMILY MEDICINE
Payer: COMMERCIAL

## 2021-12-15 LAB
ALBUMIN SERPL-MCNC: 2.4 G/DL (ref 3.5–5)
ALBUMIN UR-MCNC: 20 MG/DL
ALP SERPL-CCNC: 99 U/L (ref 45–120)
ALT SERPL W P-5'-P-CCNC: 27 U/L (ref 0–45)
ANION GAP SERPL CALCULATED.3IONS-SCNC: 7 MMOL/L (ref 5–18)
APPEARANCE UR: CLEAR
AST SERPL W P-5'-P-CCNC: 26 U/L (ref 0–40)
BACTERIA #/AREA URNS HPF: ABNORMAL /HPF
BILIRUB SERPL-MCNC: 0.6 MG/DL (ref 0–1)
BILIRUB UR QL STRIP: ABNORMAL
BUN SERPL-MCNC: 8 MG/DL (ref 8–22)
CALCIUM SERPL-MCNC: 8.8 MG/DL (ref 8.5–10.5)
CHLORIDE BLD-SCNC: 105 MMOL/L (ref 98–107)
CO2 SERPL-SCNC: 23 MMOL/L (ref 22–31)
COLOR UR AUTO: YELLOW
CREAT SERPL-MCNC: 0.53 MG/DL (ref 0.6–1.1)
ERYTHROCYTE [DISTWIDTH] IN BLOOD BY AUTOMATED COUNT: 14.2 % (ref 10–15)
GFR SERPL CREATININE-BSD FRML MDRD: >90 ML/MIN/1.73M2
GLUCOSE BLD-MCNC: 84 MG/DL (ref 70–125)
GLUCOSE UR STRIP-MCNC: NEGATIVE MG/DL
HCT VFR BLD AUTO: 28.9 % (ref 35–47)
HGB BLD-MCNC: 9.6 G/DL (ref 11.7–15.7)
HGB UR QL STRIP: NEGATIVE
HOLD SPECIMEN: NORMAL
KETONES UR STRIP-MCNC: ABNORMAL MG/DL
LEUKOCYTE ESTERASE UR QL STRIP: ABNORMAL
MAGNESIUM SERPL-MCNC: 1.5 MG/DL (ref 1.8–2.6)
MCH RBC QN AUTO: 31.8 PG (ref 26.5–33)
MCHC RBC AUTO-ENTMCNC: 33.2 G/DL (ref 31.5–36.5)
MCV RBC AUTO: 96 FL (ref 78–100)
NITRATE UR QL: NEGATIVE
PH UR STRIP: 6 [PH] (ref 5–7)
PHOSPHATE SERPL-MCNC: 2.6 MG/DL (ref 2.5–4.5)
PLATELET # BLD AUTO: 184 10E3/UL (ref 150–450)
POTASSIUM BLD-SCNC: 3.4 MMOL/L (ref 3.5–5)
POTASSIUM BLD-SCNC: 3.6 MMOL/L (ref 3.5–5)
PROT SERPL-MCNC: 5.8 G/DL (ref 6–8)
RBC # BLD AUTO: 3.02 10E6/UL (ref 3.8–5.2)
RBC URINE: <1 /HPF
SODIUM SERPL-SCNC: 135 MMOL/L (ref 136–145)
SP GR UR STRIP: 1.04 (ref 1–1.03)
SQUAMOUS EPITHELIAL: 1 /HPF
UROBILINOGEN UR STRIP-MCNC: 6 MG/DL
WBC # BLD AUTO: 4.6 10E3/UL (ref 4–11)
WBC URINE: 6 /HPF

## 2021-12-15 PROCEDURE — 250N000013 HC RX MED GY IP 250 OP 250 PS 637: Performed by: STUDENT IN AN ORGANIZED HEALTH CARE EDUCATION/TRAINING PROGRAM

## 2021-12-15 PROCEDURE — 97535 SELF CARE MNGMENT TRAINING: CPT | Mod: GO

## 2021-12-15 PROCEDURE — 258N000003 HC RX IP 258 OP 636: Performed by: FAMILY MEDICINE

## 2021-12-15 PROCEDURE — 80053 COMPREHEN METABOLIC PANEL: CPT | Performed by: STUDENT IN AN ORGANIZED HEALTH CARE EDUCATION/TRAINING PROGRAM

## 2021-12-15 PROCEDURE — 97530 THERAPEUTIC ACTIVITIES: CPT | Mod: GP

## 2021-12-15 PROCEDURE — 255N000002 HC RX 255 OP 636: Performed by: FAMILY MEDICINE

## 2021-12-15 PROCEDURE — 70553 MRI BRAIN STEM W/O & W/DYE: CPT

## 2021-12-15 PROCEDURE — 36415 COLL VENOUS BLD VENIPUNCTURE: CPT | Performed by: FAMILY MEDICINE

## 2021-12-15 PROCEDURE — 120N000001 HC R&B MED SURG/OB

## 2021-12-15 PROCEDURE — 250N000013 HC RX MED GY IP 250 OP 250 PS 637

## 2021-12-15 PROCEDURE — 250N000013 HC RX MED GY IP 250 OP 250 PS 637: Performed by: FAMILY MEDICINE

## 2021-12-15 PROCEDURE — 250N000011 HC RX IP 250 OP 636: Performed by: FAMILY MEDICINE

## 2021-12-15 PROCEDURE — A9585 GADOBUTROL INJECTION: HCPCS | Performed by: FAMILY MEDICINE

## 2021-12-15 PROCEDURE — 36415 COLL VENOUS BLD VENIPUNCTURE: CPT | Performed by: STUDENT IN AN ORGANIZED HEALTH CARE EDUCATION/TRAINING PROGRAM

## 2021-12-15 PROCEDURE — 250N000011 HC RX IP 250 OP 636: Performed by: STUDENT IN AN ORGANIZED HEALTH CARE EDUCATION/TRAINING PROGRAM

## 2021-12-15 PROCEDURE — 83735 ASSAY OF MAGNESIUM: CPT | Performed by: FAMILY MEDICINE

## 2021-12-15 PROCEDURE — 82040 ASSAY OF SERUM ALBUMIN: CPT | Performed by: STUDENT IN AN ORGANIZED HEALTH CARE EDUCATION/TRAINING PROGRAM

## 2021-12-15 PROCEDURE — 99232 SBSQ HOSP IP/OBS MODERATE 35: CPT | Mod: GC | Performed by: STUDENT IN AN ORGANIZED HEALTH CARE EDUCATION/TRAINING PROGRAM

## 2021-12-15 PROCEDURE — 84100 ASSAY OF PHOSPHORUS: CPT | Performed by: STUDENT IN AN ORGANIZED HEALTH CARE EDUCATION/TRAINING PROGRAM

## 2021-12-15 PROCEDURE — 85014 HEMATOCRIT: CPT | Performed by: STUDENT IN AN ORGANIZED HEALTH CARE EDUCATION/TRAINING PROGRAM

## 2021-12-15 PROCEDURE — 84132 ASSAY OF SERUM POTASSIUM: CPT | Performed by: STUDENT IN AN ORGANIZED HEALTH CARE EDUCATION/TRAINING PROGRAM

## 2021-12-15 RX ORDER — MAGNESIUM OXIDE 400 MG/1
400 TABLET ORAL 3 TIMES DAILY
Status: COMPLETED | OUTPATIENT
Start: 2021-12-15 | End: 2021-12-16

## 2021-12-15 RX ORDER — GADOBUTROL 604.72 MG/ML
10 INJECTION INTRAVENOUS ONCE
Status: COMPLETED | OUTPATIENT
Start: 2021-12-15 | End: 2021-12-15

## 2021-12-15 RX ADMIN — MAGNESIUM OXIDE TAB 400 MG (241.3 MG ELEMENTAL MG) 400 MG: 400 (241.3 MG) TAB at 10:49

## 2021-12-15 RX ADMIN — THERA TABS 1 TABLET: TAB at 10:49

## 2021-12-15 RX ADMIN — ACETAMINOPHEN 975 MG: 325 TABLET ORAL at 05:59

## 2021-12-15 RX ADMIN — FOLIC ACID 1 MG: 1 TABLET ORAL at 10:49

## 2021-12-15 RX ADMIN — MAGNESIUM OXIDE TAB 400 MG (241.3 MG ELEMENTAL MG) 400 MG: 400 (241.3 MG) TAB at 14:03

## 2021-12-15 RX ADMIN — GADOBUTROL 10 ML: 604.72 INJECTION INTRAVENOUS at 16:14

## 2021-12-15 RX ADMIN — MAGNESIUM OXIDE TAB 400 MG (241.3 MG ELEMENTAL MG) 400 MG: 400 (241.3 MG) TAB at 22:09

## 2021-12-15 RX ADMIN — THIAMINE HYDROCHLORIDE 500 MG: 100 INJECTION, SOLUTION INTRAMUSCULAR; INTRAVENOUS at 10:49

## 2021-12-15 RX ADMIN — ENOXAPARIN SODIUM 40 MG: 40 INJECTION SUBCUTANEOUS at 22:09

## 2021-12-15 RX ADMIN — ACETAMINOPHEN 975 MG: 325 TABLET ORAL at 14:01

## 2021-12-15 RX ADMIN — THIAMINE HYDROCHLORIDE 500 MG: 100 INJECTION, SOLUTION INTRAMUSCULAR; INTRAVENOUS at 17:42

## 2021-12-15 ASSESSMENT — ACTIVITIES OF DAILY LIVING (ADL)
ADLS_ACUITY_SCORE: 15
ADLS_ACUITY_SCORE: 11
ADLS_ACUITY_SCORE: 15
ADLS_ACUITY_SCORE: 11
ADLS_ACUITY_SCORE: 15
ADLS_ACUITY_SCORE: 11
ADLS_ACUITY_SCORE: 11
ADLS_ACUITY_SCORE: 15
ADLS_ACUITY_SCORE: 15
ADLS_ACUITY_SCORE: 11
ADLS_ACUITY_SCORE: 15
ADLS_ACUITY_SCORE: 21
ADLS_ACUITY_SCORE: 15
ADLS_ACUITY_SCORE: 11
ADLS_ACUITY_SCORE: 15
ADLS_ACUITY_SCORE: 11
ADLS_ACUITY_SCORE: 15
ADLS_ACUITY_SCORE: 11
ADLS_ACUITY_SCORE: 15
ADLS_ACUITY_SCORE: 11
ADLS_ACUITY_SCORE: 11
ADLS_ACUITY_SCORE: 15

## 2021-12-15 ASSESSMENT — MIFFLIN-ST. JEOR: SCORE: 1654.99

## 2021-12-15 NOTE — PROGRESS NOTES
Physical Therapy        12/14/21 0834   Quick Adds   Type of Visit Initial PT Evaluation   Living Environment   People in home alone   Current Living Arrangements apartment   Home Accessibility no concerns   Transportation Anticipated car, drives self   Self-Care   Usual Activity Tolerance good   Current Activity Tolerance poor   Regular Exercise Yes   Activity/Exercise Type walking   Exercise Amount/Frequency 10 mins   Equipment Currently Used at Home walker, standard  (no wheels)   Activity/Exercise/Self-Care Comment works full time from home for [employer]   General Information   Onset of Illness/Injury or Date of Surgery 12/13/21   Referring Physician Julienne Phelan MD   Patient/Family Therapy Goals Statement (PT) none stated   Pertinent History of Current Problem (include personal factors and/or comorbidities that impact the POC) weakness, anascara, ETOH, FTT   Existing Precautions/Restrictions fall   Cognition   Memory Deficit (Cognition) short-term memory;moderate deficit   Pain Assessment   Patient Currently in Pain No   Posture    Posture Not impaired   Range of Motion (ROM)   ROM Quick Adds ROM WFL   Strength   Strength Comments LE strength grossly 4/5, symmetrical   Bed Mobility   Comment (Bed Mobility) modA   Transfers   Transfer Safety Comments Shmuel   Sensory Examination   Sensory Perception patient reports no sensory changes   Coordination   Coordination no deficits were identified   Muscle Tone   Muscle Tone Comments some decrease in UE muscle tone    Clinical Impression   Criteria for Skilled Therapeutic Intervention yes, treatment indicated   PT Diagnosis (PT) unsteady gait   Influenced by the following impairments weakness   Functional limitations due to impairments high risk of falls   Clinical Presentation Stable/Uncomplicated   Clinical Presentation Rationale presents as medically diagnosed   Clinical Decision Making (Complexity) moderate complexity   Therapy Frequency (PT) Daily   Predicted Duration  of Therapy Intervention (days/wks) 1 week   Planned Therapy Interventions (PT) balance training;bed mobility training;gait training;home exercise program;neuromuscular re-education;patient/family education;postural re-education;ROM (range of motion);strengthening;transfer training;wheelchair management/propulsion training;progressive activity/exercise   Anticipated Equipment Needs at Discharge (PT) walker, rolling   Risk & Benefits of therapy have been explained evaluation/treatment results reviewed;care plan/treatment goals reviewed;participants voiced agreement with care plan;participants included;patient   PT Discharge Planning    PT Discharge Recommendation (DC Rec) Transitional Care Facility;home with home care physical therapy  (would need 24 hr assist at home)   PT Rationale for DC Rec pt requires assistance with all transfers and cares at this time   Total Evaluation Time   Total Evaluation Time (Minutes) 12       Lili Lindsay DPT 12/15/2021

## 2021-12-15 NOTE — PLAN OF CARE
Problem: Muscle Strength Impairment  Goal: Improved Muscle Strength  Outcome: No Change     Problem: Pain Acute  Goal: Acceptable Pain Control and Functional Ability  Outcome: Improving       Pt denies pain at this time. Denies N/T. LSC but diminished. BS active.     Unable to void. Bladder scanned for 530. Straight cathed for 500.  Kelley area is red.

## 2021-12-15 NOTE — PROGRESS NOTES
Progress Note    Subjective  Reports she is feeling a bit better today. Receptive to conversation about lessening/stopping drinking moving forward.    Objective    Vital signs in last 24 hours Temp:  [97.7  F (36.5  C)-97.9  F (36.6  C)] 97.9  F (36.6  C)  Pulse:  [74-91] 74  Resp:  [18] 18  BP: (116-131)/(61-64) 116/61  SpO2:  [97 %-99 %] 99 %   Weight: [unfilled]    Intake/Output last 3 shift I/O last 3 completed shifts:  In: 2022 [P.O.:2022]  Out: 500 [Urine:500]    Intake/Output this shift:No intake/output data recorded.    Physical Exam  General: alert, comfortable, sitting in chair eating breakfast  Chest: clear to auscultation  Cor: RRR, no murmurs, rubs, or gallops  Ext: 1+ pitting edema to knees bilaterally. 3cm in diameter scab on right anterior shin. Venous stasis changes bilaterally  Neuro: CN II-XII grossly intact. Strength 4/5 throughout, symmetric. No longer any horizontal nystagmus. Tangential round about answers to all questions    Mini cog score 5/5 today    Pertinent Labs and Pertinent Radiology   Lab Results: personally reviewed.   K 3.4  Protein 6.0, albumin 2.4  Hgb 9.6, MCV 96  TSH 3.85  B12 979 and Ferritin 877      Recent Labs   Lab 12/15/21  0713   WBC 4.6   HGB 9.6*   HCT 28.9*          Recent Labs   Lab 12/15/21  0713   *   CO2 23   BUN 8   ALBUMIN 2.4*   ALKPHOS 99   ALT 27   AST 26         Assessment/Plan  Autumn D Kenny is a 61 year old female with a history significant for falling, lumbar back injury, lumbar back pain, chronic leg swelling, hypertension, and alcohol use presenting for evaluation of generalized weakness and inability to get out of bed prior to arrival, admitted for failure to thrive and work-up of lower back pain.     Failure to thrive  Generalized weakness  Alcohol use disorder  Patient notes general weakness that waxes and wanes in her arms and legs over the past few months. She recently started working from home and reports gaining weight and being less  active. She recently started using a walker. She was unable to get out of bed the morning of 12/13, and per paramedics report, appeared unkempt as well as her apartment. She also endorses significant depression over the past year with losing her boyfriend and COVID, and becomes tearful when discussing her worries. Reporting 4-5 drinks per day for many years - LFTs reflective of chronic use, though INR and platelets normal so still has good synthetic function. Mini cog done 12/14 - score of 1/5, 12/15 mini cog was 5/5 after a day of high dose thiamine replacement. Differential includes thyroid pathology, B12 def, malignancy, spine pathology (stenosis?), multiple myeloma given gamma gap, depression, alcohol use disorder, intracranial pathology (chronic subdurals, NPH). TSH normal. CT head 11/16/21 normal. MR C and L spine 12/14 unremarkable. Thiamine repletion started on 12/14 with marked improvement in cognition and neurologic function today (12/15). 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    - PT/OT - placement to TCU  - MR of lumbar and cervical spine  - Serum protein electrophoresis with immunofixation, in process  - Check Mg and Phos and replace accordingly - at risk for refeeding  - Continue high dose thiamine, folic acid, multivitamin given chronic alcohol use  - Will discuss cessation meds tomorrow. Thinking acamprosate      Hx fall in October 2021  Lumbar back pain  Patient reports chronic lower back pain for many years. Last October, while kicking clothes out of the way, she fell back onto her back and head. Work-up in the ED was unremarkable. XR of her back with moderate disc space narrowing at L4-L5 and mild-moderate facet arthropathy at L3-L4 and L4-L5. She was seen at the spine clinic on 12/02 with recommendation for MR. 12/14 MR Cervical spine was normal, no spinal stenosis; 12/14 MR Lumbar spine shows mild lumbar spondylosis  without spinal canal stenosis.   - MR lumbar/cervical spine completed 12/14, normal read  - pain control: tylenol scheduled, oxycodone 5 mg q4h severe pain     Chronic peripheral edema  Patient does not report PND or orthopnea. BNP 11. CXR with normal size heart. Low suspicion for CHF. US lower extremity venous duplex negative for DVT. More likely dependent edema/venous stasis. Given elevated LFT's and history of increased alcohol use considering liver disease as a contributing factor as well.  - CMP     Shortness of breath  This has also been going on since January. She notes it may be worse with activity, but it highly varies. No orthopnea, PND, palpitations, lightheadedness, or associated chest pain with the SOB. She notes it happens when she thinks of things that worry her. D-dimer elevated to 0.78. CXR normal. CT without PE and no signs of acute disease. US lower extremity venous duplex negative for DVT. Seems mostly deconditioning related.     Splenic artery aneurysm  2.5 cm splenic artery aneurysm found on CT of chest. IR consulted with recommendation for outpatient splenic aneurysm embolization.  - IR consulted, recommend embolization outpatient  - Follow-up with IR at [facility] clinic after discharge.     Hypokalemia  In setting of poor PO.  -K replacement protocol     Prolonged QTc  Qtc 539 on admission.  - avoid QT prolonging medications    Severe Protein-Calorie Malnutrition  Per RD eval  -Lyte replacement and monitoring as above           Diet: Combination Diet Regular Diet Adult  DVT Prophylaxis: Enoxaparin (Lovenox) SQ  Fermin Catheter: Not present  Fluids: None  Central Lines: None  Code Status: No CPR- Do NOT Intubate    Precepted patient with Dr. Ronal Caraballo  [facility]  Medical Student, MS3  0625, December 15, 2021       I was present with the medical student who participated in the service and in the documentation of this note. I have verified the history and  personally performed the physical exam and medical decision making, and have verified the content of the note, which accurately reflects my assessment of the patient and the plan of care.       Taz Durán MD - PGY2  [facility]  P: [phone]

## 2021-12-15 NOTE — PLAN OF CARE
Problem: Pain Acute  Goal: Acceptable Pain Control and Functional Ability  Outcome: Improving   pt denies pain. On scheduled tylenol.  Problem: Confusion Acute  Goal: Optimal Cognitive Function  Outcome: Improving  Pt alert and oriented but forgetful. Encouraged to drink.  Skin excoriation on buttocks; mepilex border placed. Turned and repositioned.

## 2021-12-16 ENCOUNTER — APPOINTMENT (OUTPATIENT)
Dept: OCCUPATIONAL THERAPY | Facility: HOSPITAL | Age: 61
DRG: 640 | End: 2021-12-16
Payer: COMMERCIAL

## 2021-12-16 ENCOUNTER — APPOINTMENT (OUTPATIENT)
Dept: PHYSICAL THERAPY | Facility: HOSPITAL | Age: 61
DRG: 640 | End: 2021-12-16
Payer: COMMERCIAL

## 2021-12-16 LAB
ALBUMIN PERCENT: 52.5 % (ref 51–67)
ALBUMIN SERPL ELPH-MCNC: 3.4 G/DL (ref 3.2–4.7)
ALBUMIN SERPL-MCNC: 2.3 G/DL (ref 3.5–5)
ALP SERPL-CCNC: 87 U/L (ref 45–120)
ALPHA 1 PERCENT: 4.1 % (ref 2–4)
ALPHA 2 PERCENT: 13.4 % (ref 5–13)
ALPHA1 GLOB SERPL ELPH-MCNC: 0.3 G/DL (ref 0.1–0.3)
ALPHA2 GLOB SERPL ELPH-MCNC: 0.9 G/DL (ref 0.4–0.9)
ALT SERPL W P-5'-P-CCNC: 23 U/L (ref 0–45)
ANION GAP SERPL CALCULATED.3IONS-SCNC: 9 MMOL/L (ref 5–18)
AST SERPL W P-5'-P-CCNC: 21 U/L (ref 0–40)
B-GLOBULIN SERPL ELPH-MCNC: 1 G/DL (ref 0.7–1.2)
BACTERIA UR CULT: NORMAL
BETA PERCENT: 14.9 % (ref 10–17)
BILIRUB SERPL-MCNC: 0.4 MG/DL (ref 0–1)
BUN SERPL-MCNC: 6 MG/DL (ref 8–22)
CALCIUM SERPL-MCNC: 8.4 MG/DL (ref 8.5–10.5)
CHLORIDE BLD-SCNC: 106 MMOL/L (ref 98–107)
CO2 SERPL-SCNC: 23 MMOL/L (ref 22–31)
CREAT SERPL-MCNC: 0.53 MG/DL (ref 0.6–1.1)
ERYTHROCYTE [DISTWIDTH] IN BLOOD BY AUTOMATED COUNT: 14.2 % (ref 10–15)
GAMMA GLOB SERPL ELPH-MCNC: 1 G/DL (ref 0.6–1.4)
GAMMA GLOBULIN PERCENT: 15.1 % (ref 9–20)
GFR SERPL CREATININE-BSD FRML MDRD: >90 ML/MIN/1.73M2
GLUCOSE BLD-MCNC: 94 MG/DL (ref 70–125)
HCT VFR BLD AUTO: 28.5 % (ref 35–47)
HGB BLD-MCNC: 9.4 G/DL (ref 11.7–15.7)
MCH RBC QN AUTO: 32 PG (ref 26.5–33)
MCHC RBC AUTO-ENTMCNC: 33 G/DL (ref 31.5–36.5)
MCV RBC AUTO: 97 FL (ref 78–100)
PATH ICD:: ABNORMAL
PATH ICD:: NORMAL
PHOSPHATE SERPL-MCNC: 2.6 MG/DL (ref 2.5–4.5)
PLATELET # BLD AUTO: 199 10E3/UL (ref 150–450)
POTASSIUM BLD-SCNC: 3.3 MMOL/L (ref 3.5–5)
POTASSIUM BLD-SCNC: 3.8 MMOL/L (ref 3.5–5)
PROT PATTERN SERPL ELPH-IMP: ABNORMAL
PROT PATTERN SERPL IFE-IMP: NORMAL
PROT SERPL-MCNC: 5.7 G/DL (ref 6–8)
RBC # BLD AUTO: 2.94 10E6/UL (ref 3.8–5.2)
REVIEWING PATHOLOGIST: ABNORMAL
REVIEWING PATHOLOGIST: NORMAL
SODIUM SERPL-SCNC: 138 MMOL/L (ref 136–145)
TOTAL PROTEIN SERUM FOR ELP (SYNCED VALUE): 6.5 G/DL
WBC # BLD AUTO: 5.3 10E3/UL (ref 4–11)

## 2021-12-16 PROCEDURE — 97110 THERAPEUTIC EXERCISES: CPT | Mod: GO

## 2021-12-16 PROCEDURE — 82040 ASSAY OF SERUM ALBUMIN: CPT | Performed by: STUDENT IN AN ORGANIZED HEALTH CARE EDUCATION/TRAINING PROGRAM

## 2021-12-16 PROCEDURE — 84165 PROTEIN E-PHORESIS SERUM: CPT | Mod: 26 | Performed by: PATHOLOGY

## 2021-12-16 PROCEDURE — 250N000013 HC RX MED GY IP 250 OP 250 PS 637

## 2021-12-16 PROCEDURE — 120N000001 HC R&B MED SURG/OB

## 2021-12-16 PROCEDURE — 250N000013 HC RX MED GY IP 250 OP 250 PS 637: Performed by: STUDENT IN AN ORGANIZED HEALTH CARE EDUCATION/TRAINING PROGRAM

## 2021-12-16 PROCEDURE — 97530 THERAPEUTIC ACTIVITIES: CPT | Mod: GP

## 2021-12-16 PROCEDURE — 97110 THERAPEUTIC EXERCISES: CPT | Mod: GP

## 2021-12-16 PROCEDURE — 97116 GAIT TRAINING THERAPY: CPT | Mod: GP

## 2021-12-16 PROCEDURE — 97535 SELF CARE MNGMENT TRAINING: CPT | Mod: GO

## 2021-12-16 PROCEDURE — 85027 COMPLETE CBC AUTOMATED: CPT | Performed by: STUDENT IN AN ORGANIZED HEALTH CARE EDUCATION/TRAINING PROGRAM

## 2021-12-16 PROCEDURE — 36415 COLL VENOUS BLD VENIPUNCTURE: CPT | Performed by: STUDENT IN AN ORGANIZED HEALTH CARE EDUCATION/TRAINING PROGRAM

## 2021-12-16 PROCEDURE — 250N000011 HC RX IP 250 OP 636: Performed by: FAMILY MEDICINE

## 2021-12-16 PROCEDURE — 36415 COLL VENOUS BLD VENIPUNCTURE: CPT | Performed by: FAMILY MEDICINE

## 2021-12-16 PROCEDURE — 250N000013 HC RX MED GY IP 250 OP 250 PS 637: Performed by: FAMILY MEDICINE

## 2021-12-16 PROCEDURE — 84132 ASSAY OF SERUM POTASSIUM: CPT | Performed by: FAMILY MEDICINE

## 2021-12-16 PROCEDURE — 250N000011 HC RX IP 250 OP 636: Performed by: STUDENT IN AN ORGANIZED HEALTH CARE EDUCATION/TRAINING PROGRAM

## 2021-12-16 PROCEDURE — 258N000003 HC RX IP 258 OP 636: Performed by: FAMILY MEDICINE

## 2021-12-16 PROCEDURE — 99232 SBSQ HOSP IP/OBS MODERATE 35: CPT | Mod: GC | Performed by: STUDENT IN AN ORGANIZED HEALTH CARE EDUCATION/TRAINING PROGRAM

## 2021-12-16 PROCEDURE — 84100 ASSAY OF PHOSPHORUS: CPT | Performed by: FAMILY MEDICINE

## 2021-12-16 PROCEDURE — 86334 IMMUNOFIX E-PHORESIS SERUM: CPT | Mod: 26 | Performed by: PATHOLOGY

## 2021-12-16 RX ORDER — POTASSIUM CHLORIDE 1500 MG/1
40 TABLET, EXTENDED RELEASE ORAL ONCE
Status: COMPLETED | OUTPATIENT
Start: 2021-12-16 | End: 2021-12-16

## 2021-12-16 RX ORDER — ACAMPROSATE CALCIUM 333 MG/1
666 TABLET, DELAYED RELEASE ORAL 3 TIMES DAILY
Status: DISCONTINUED | OUTPATIENT
Start: 2021-12-16 | End: 2021-12-21 | Stop reason: HOSPADM

## 2021-12-16 RX ADMIN — MAGNESIUM OXIDE TAB 400 MG (241.3 MG ELEMENTAL MG) 400 MG: 400 (241.3 MG) TAB at 20:32

## 2021-12-16 RX ADMIN — POTASSIUM CHLORIDE 40 MEQ: 1500 TABLET, EXTENDED RELEASE ORAL at 09:37

## 2021-12-16 RX ADMIN — ENOXAPARIN SODIUM 40 MG: 40 INJECTION SUBCUTANEOUS at 21:01

## 2021-12-16 RX ADMIN — ACETAMINOPHEN 975 MG: 325 TABLET ORAL at 20:31

## 2021-12-16 RX ADMIN — FOLIC ACID 1 MG: 1 TABLET ORAL at 09:50

## 2021-12-16 RX ADMIN — ACAMPROSATE CALCIUM 666 MG: 333 TABLET, DELAYED RELEASE ORAL at 11:38

## 2021-12-16 RX ADMIN — MAGNESIUM OXIDE TAB 400 MG (241.3 MG ELEMENTAL MG) 400 MG: 400 (241.3 MG) TAB at 09:37

## 2021-12-16 RX ADMIN — THIAMINE HYDROCHLORIDE 250 MG: 100 INJECTION, SOLUTION INTRAMUSCULAR; INTRAVENOUS at 09:50

## 2021-12-16 RX ADMIN — THERA TABS 1 TABLET: TAB at 09:37

## 2021-12-16 RX ADMIN — ACETAMINOPHEN 975 MG: 325 TABLET ORAL at 13:22

## 2021-12-16 RX ADMIN — ACAMPROSATE CALCIUM 666 MG: 333 TABLET, DELAYED RELEASE ORAL at 14:07

## 2021-12-16 RX ADMIN — ACAMPROSATE CALCIUM 666 MG: 333 TABLET, DELAYED RELEASE ORAL at 20:31

## 2021-12-16 RX ADMIN — MAGNESIUM OXIDE TAB 400 MG (241.3 MG ELEMENTAL MG) 400 MG: 400 (241.3 MG) TAB at 14:08

## 2021-12-16 ASSESSMENT — ACTIVITIES OF DAILY LIVING (ADL)
ADLS_ACUITY_SCORE: 21
ADLS_ACUITY_SCORE: 21
ADLS_ACUITY_SCORE: 19
ADLS_ACUITY_SCORE: 19
ADLS_ACUITY_SCORE: 20
ADLS_ACUITY_SCORE: 21
ADLS_ACUITY_SCORE: 21
ADLS_ACUITY_SCORE: 20
ADLS_ACUITY_SCORE: 21
ADLS_ACUITY_SCORE: 20
ADLS_ACUITY_SCORE: 21
ADLS_ACUITY_SCORE: 19
ADLS_ACUITY_SCORE: 20
ADLS_ACUITY_SCORE: 20
ADLS_ACUITY_SCORE: 21
ADLS_ACUITY_SCORE: 21
ADLS_ACUITY_SCORE: 20
ADLS_ACUITY_SCORE: 21
ADLS_ACUITY_SCORE: 21
ADLS_ACUITY_SCORE: 19
ADLS_ACUITY_SCORE: 21
ADLS_ACUITY_SCORE: 20

## 2021-12-16 ASSESSMENT — MIFFLIN-ST. JEOR: SCORE: 1715.31

## 2021-12-16 NOTE — CONSULTS
SW consult for chemical dependency resources completed. SW spent an hour and a half with pt discussing her chemical use. Pt feels she is ready to make changes and is open to resources. Pt is agreeable to CD and mental health resources. Pt stated she does drink too much but wouldn't want to be labeled an alcoholic. Stated she started withdrawing form social events last summer and realized she has a problem. SW validated pt's feelings, and stated that SW is here to offer support and any resources that will help her once she discharge. SW also discussed TCU rec's. Pt is open to TCU referrals in [facility].       MINO Salvador  12/16/2021  3:28 PM

## 2021-12-16 NOTE — PLAN OF CARE
Problem: Muscle Strength Impairment  Goal: Improved Muscle Strength  Outcome: No Change     Problem: Pain Acute  Goal: Acceptable Pain Control and Functional Ability  Outcome: Improving         Pt denies pain except with movement. No meds given. LS diminished. BS active. Incontinent of B/B. Had one episode of loose, watery stool.     Pt a/o, often repeats story.

## 2021-12-16 NOTE — PLAN OF CARE
Problem: Adult Inpatient Plan of Care  Goal: Patient-Specific Goal (Individualized)  Outcome: Improving   Nasrin stated this covid and working from home has been a stressful time for her and then she lost her boyfriend. States she found herself having a beer earlier and more often than usual. Happy to have a doctor who so kindly explained her test results and plan for prescribing a medication that can help her to slow or stop her drinking. Says she felt he was very kind and did not make her feel guilty about her condition.  Problem: Muscle Strength Impairment  Goal: Improved Muscle Strength  Outcome: Improving   Therapy is working with her. Up in chair. Enc. To increase her activities. Had an open area on her buttock on admission from sitting too much.

## 2021-12-16 NOTE — PROGRESS NOTES
Progress Note    Subjective  Feeling good this morning. Had heart-to-heart with her brother yesterday, who also suffers from alcohol use disorder.  She states that she told him that she has not created to drink since she came into the hospital.  Asking appropriate questions about how much of a role alcohol played and landing her here in the hospital and what she should be doing when she goes home.    Objective    Vital signs in last 24 hours Temp:  [96.8  F (36  C)-98.1  F (36.7  C)] 98.1  F (36.7  C)  Pulse:  [68-87] 86  Resp:  [18-20] 18  BP: (114-132)/(61-68) 115/61  SpO2:  [98 %-100 %] 98 %   Weight: [unfilled]    Intake/Output last 3 shift I/O last 3 completed shifts:  In: 1320 [P.O.:1320]  Out: -     Intake/Output this shift:No intake/output data recorded.    Physical Exam  General: alert, comfortable, sitting in chair eating breakfast  Chest: clear to auscultation  Cor: RRR, no murmurs, rubs, or gallops  Ext: 1+ pitting edema to knees bilaterally. 3cm in diameter scab on right anterior shin. Venous stasis changes bilaterally  Neuro: CN II-XII grossly intact. Strength 4/5 throughout, symmetric. No longer any horizontal nystagmus. Tangential round about answers to all questions      Pertinent Labs and Pertinent Radiology   Lab Results: personally reviewed.   K 3.3  Protein 5.7, albumin 2.3  Hgb 9.4, MCV 97        Recent Labs   Lab 12/16/21  0719   WBC 5.3   HGB 9.4*   HCT 28.5*          Recent Labs   Lab 12/16/21  0719      CO2 23   BUN 6*   ALBUMIN 2.3*   ALKPHOS 87   ALT 23   AST 21         Assessment/Plan  Autumn D Kenny is a 61 year old female with a history significant for falling, lumbar back injury, lumbar back pain, chronic leg swelling, hypertension, and alcohol use presenting for evaluation of generalized weakness and inability to get out of bed prior to arrival, admitted for failure to thrive and work-up of lower back pain.     Failure to thrive  Alcohol use disorder  Wernicke's  Encephalopathy  Patient notes general weakness that waxes and wanes in her arms and legs over the past few months. She recently started working from home and reports gaining weight and being less active. She recently started using a walker. She was unable to get out of bed the morning of 12/13, and per paramedics report, appeared unkempt as well as her apartment. She also endorses significant depression over the past year with losing her boyfriend and COVID, and becomes tearful when discussing her worries. Reporting 4-5 drinks per day for many years - LFTs reflective of chronic use, though INR and platelets normal so still has good synthetic function. Albumin slightly low. Differential includes thyroid pathology, B12 def, malignancy, spine pathology, multiple myeloma given gamma gap, depression, alcohol use disorder, intracranial pathology (chronic subdurals, NPH). TSH normal. CT head 11/16/21 normal. MR C and L spine 12/14 unremarkable. MR brain 12/15 showing brain atrophy and chronic microvascular changes but otherwise normal. Thiamine repletion started on 12/14 with marked improvement in cognition and neurologic function (Mini cog done 12/14 - score of 1/5, 12/15 mini cog was 5/5 after a day of high dose thiamine replacement). 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   - PT/OT - placement to TCU  - Serum protein electrophoresis with immunofixation, in process  - Check Mg and Phos and replace accordingly - at risk for refeeding  - Continue high dose thiamine, folic acid, multivitamin given chronic alcohol use  - Agreed to start acamprosate - 666mg TID     Hx fall in October 2021  Lumbar back pain  Patient reports chronic lower back pain for many years. Last October, while kicking clothes out of the way, she fell back onto her back and head. Work-up in the ED was unremarkable. XR of her back with  moderate disc space narrowing at L4-L5 and mild-moderate facet arthropathy at L3-L4 and L4-L5. She was seen at the spine clinic on 12/02 with recommendation for MR. 12/14 MR Cervical spine was normal, no spinal stenosis; 12/14 MR Lumbar spine shows mild lumbar spondylosis without spinal canal stenosis.   - MR lumbar/cervical spine completed 12/14, normal read  - pain control: tylenol scheduled, oxycodone 5 mg q4h severe pain     Chronic peripheral edema  Patient does not report PND or orthopnea. BNP 11. CXR with normal size heart. Low suspicion for CHF. US lower extremity venous duplex negative for DVT. More likely dependent edema/venous stasis. Given elevated LFT's and history of increased alcohol use considering liver disease as a contributing factor as well.  - CMP     Shortness of breath  This has also been going on since January. She notes it may be worse with activity, but it highly varies. No orthopnea, PND, palpitations, lightheadedness, or associated chest pain with the SOB. She notes it happens when she thinks of things that worry her. D-dimer elevated to 0.78. CXR normal. CT without PE and no signs of acute disease. US lower extremity venous duplex negative for DVT. Seems mostly deconditioning related.     Splenic artery aneurysm  2.5 cm splenic artery aneurysm found on CT of chest. IR consulted with recommendation for outpatient splenic aneurysm embolization.  - IR consulted, recommend embolization outpatient  - Follow-up with IR at [facility] clinic after discharge.     Hypokalemia  In setting of poor PO.  -K replacement protocol     Prolonged QTc  Qtc 539 on admission.  - avoid QT prolonging medications    Severe Protein-Calorie Malnutrition  Per RD eval  -Lyte replacement and monitoring as above           Diet: Combination Diet Regular Diet Adult  DVT Prophylaxis: Enoxaparin (Lovenox) SQ  Fermin Catheter: Not present  Fluids: None  Central Lines: None  Code Status: No CPR- Do NOT Intubate    Precepted patient  with Dr. Ronal Durán MD - PGY2  [facility] [place] [facility]  P: [phone]

## 2021-12-16 NOTE — PROGRESS NOTES
"CLINICAL NUTRITION SERVICES - REASSESSMENT NOTE     Nutrition Prescription    RECOMMENDATIONS FOR MDs/PROVIDERS TO ORDER:  Continue electrolyte replacement protocols- showing s/s refeeding    Malnutrition Status:    Severe    Recommendations already ordered by Registered Dietitian (RD):  No new    Future/Additional Recommendations:  Monitor Need for additional supplements     EVALUATION OF THE PROGRESS TOWARD GOALS   Diet: Regular  Intake: Good, > 75% of meals. Ordering 3 meals/day at 6442-8093 kcal, 78-92 g protein/day    Good fluid intake, 1320 ml/day     ANTHROPOMETRICS  Height: 170.2 cm (5' 7\")  Most Recent Weight: 104.7 kg (230 lb 14.4 oz)  12/15, stable   IBW: 61.3 kg  BMI: Obesity Grade II BMI 35-39.9  (fluid accumulation)  Weight History:       Wt Readings from Last 3 Encounters:   12/13/21 104.3 kg (230 lb)   11/16/21 124.7 kg (275 lb)   09/14/21 124.7 kg (275 lb)      Pt reports her weight varies from 180 -190 lb and up    Dosing Weight: 72.1 kg  Adjusted BW     ASSESSED NUTRITION NEEDS  Estimated Energy Needs: 2200 kcals/day (30 kcal/kg)  Justification: Obese  Estimated Protein Needs: 72-87 grams protein/day (1 - 1.2 grams of pro/kg)  Justification: Repletion  Estimated Fluid Needs: 1800- 2200 mL/day (25 - 30 mL/kg), or per provider   Justification: Maintenance      PHYSICAL FINDINGS  See malnutrition section below.    GI   1 soft/watery BM/day     LABS  Reviewed  K+ 3.3, decreased  Mag 1.5 12/15, decreased- to be rechecked tomorrow  On k+ and mag replacement protocol    MEDICATIONS  Reviewed  Folic acid, mvi, thiamine  Mag ox replacement yesterday and today. K+ replacement today    MALNUTRITION:  % Weight Loss:  > 5% in 1 month (severe malnutrition)  % Intake:  </= 75% for >/= 1 month (severe malnutrition)  Subcutaneous Fat Loss:  None noted  Muscle Loss:  None noted  Fluid Retention:  Moderate      Malnutrition Diagnosis: Severe malnutrition  In Context of:  Environmental or social " circumstances    Previous Goals   Electrolytes WDL  Evaluation: Not met    Patient to consume % of nutritionally adequate meals three times per day, or the equivalent with supplements/snacks.  Evaluation: Met    Previous Nutrition Diagnosis  Malnutrition related to environmental and social circumstances as evidenced by >5% weight loss in one month estimated <=75% of energy needs for >= one month, fluid accumulation    Evaluation: Improving    CURRENT NUTRITION DIAGNOSIS  Malnutrition related to environmental and social circumstances as evidenced by >5% weight loss in one month estimated <=75% of energy needs for >= one month, fluid accumulation      INTERVENTIONS  Implementation  Recommend continue electrolyte replacement protocols    Goals  Electrolytes WNL    Monitoring/Evaluation  Progress toward goals will be monitored and evaluated per protocol.

## 2021-12-16 NOTE — PLAN OF CARE
Problem: Muscle Strength Impairment  Goal: Improved Muscle Strength  Outcome: No Change   Patient assist of 2 with transfers. Activity encouraged but patient declined stating pt was tired from MRI.     Problem: Pain Acute  Goal: Acceptable Pain Control and Functional Ability  Outcome: Improving   Patient refused pain this shift.     Problem: Confusion Acute  Goal: Optimal Cognitive Function  Outcome: Improving   Patient alert & oriented x 4 this shift. No acute changes noted.

## 2021-12-16 NOTE — PLAN OF CARE
Problem: Muscle Strength Impairment  Goal: Improved Muscle Strength  Outcome: Improving   Pt said she feels stronger today. Still assist x2 to pivot.     Problem: Pain Acute  Goal: Acceptable Pain Control and Functional Ability  Outcome: Improving   Denies pain.    Problem: Adult Inpatient Plan of Care  Goal: Readiness for Transition of Care  Outcome: Improving   Positive outlook at TCU.     -Incontinent of urine.

## 2021-12-17 ENCOUNTER — APPOINTMENT (OUTPATIENT)
Dept: PHYSICAL THERAPY | Facility: HOSPITAL | Age: 61
DRG: 640 | End: 2021-12-17
Payer: COMMERCIAL

## 2021-12-17 ENCOUNTER — APPOINTMENT (OUTPATIENT)
Dept: OCCUPATIONAL THERAPY | Facility: HOSPITAL | Age: 61
DRG: 640 | End: 2021-12-17
Payer: COMMERCIAL

## 2021-12-17 LAB
ALBUMIN SERPL-MCNC: 2.5 G/DL (ref 3.5–5)
ALP SERPL-CCNC: 94 U/L (ref 45–120)
ALT SERPL W P-5'-P-CCNC: 27 U/L (ref 0–45)
ANION GAP SERPL CALCULATED.3IONS-SCNC: 9 MMOL/L (ref 5–18)
AST SERPL W P-5'-P-CCNC: 30 U/L (ref 0–40)
BILIRUB SERPL-MCNC: 0.4 MG/DL (ref 0–1)
BUN SERPL-MCNC: 7 MG/DL (ref 8–22)
CALCIUM SERPL-MCNC: 9 MG/DL (ref 8.5–10.5)
CHLORIDE BLD-SCNC: 107 MMOL/L (ref 98–107)
CO2 SERPL-SCNC: 23 MMOL/L (ref 22–31)
CREAT SERPL-MCNC: 0.53 MG/DL (ref 0.6–1.1)
ERYTHROCYTE [DISTWIDTH] IN BLOOD BY AUTOMATED COUNT: 14.3 % (ref 10–15)
GFR SERPL CREATININE-BSD FRML MDRD: >90 ML/MIN/1.73M2
GLUCOSE BLD-MCNC: 94 MG/DL (ref 70–125)
HCT VFR BLD AUTO: 28.5 % (ref 35–47)
HGB BLD-MCNC: 9.6 G/DL (ref 11.7–15.7)
MAGNESIUM SERPL-MCNC: 1.6 MG/DL (ref 1.8–2.6)
MCH RBC QN AUTO: 32.4 PG (ref 26.5–33)
MCHC RBC AUTO-ENTMCNC: 33.7 G/DL (ref 31.5–36.5)
MCV RBC AUTO: 96 FL (ref 78–100)
PHOSPHATE SERPL-MCNC: 2.7 MG/DL (ref 2.5–4.5)
PLATELET # BLD AUTO: 219 10E3/UL (ref 150–450)
POTASSIUM BLD-SCNC: 3.5 MMOL/L (ref 3.5–5)
PROT SERPL-MCNC: 6 G/DL (ref 6–8)
RBC # BLD AUTO: 2.96 10E6/UL (ref 3.8–5.2)
SODIUM SERPL-SCNC: 139 MMOL/L (ref 136–145)
WBC # BLD AUTO: 4.2 10E3/UL (ref 4–11)

## 2021-12-17 PROCEDURE — 36415 COLL VENOUS BLD VENIPUNCTURE: CPT | Performed by: STUDENT IN AN ORGANIZED HEALTH CARE EDUCATION/TRAINING PROGRAM

## 2021-12-17 PROCEDURE — 97535 SELF CARE MNGMENT TRAINING: CPT | Mod: GO

## 2021-12-17 PROCEDURE — 250N000013 HC RX MED GY IP 250 OP 250 PS 637: Performed by: STUDENT IN AN ORGANIZED HEALTH CARE EDUCATION/TRAINING PROGRAM

## 2021-12-17 PROCEDURE — 84100 ASSAY OF PHOSPHORUS: CPT | Performed by: FAMILY MEDICINE

## 2021-12-17 PROCEDURE — 97530 THERAPEUTIC ACTIVITIES: CPT | Mod: GO

## 2021-12-17 PROCEDURE — 97110 THERAPEUTIC EXERCISES: CPT | Mod: GO

## 2021-12-17 PROCEDURE — 250N000011 HC RX IP 250 OP 636: Performed by: STUDENT IN AN ORGANIZED HEALTH CARE EDUCATION/TRAINING PROGRAM

## 2021-12-17 PROCEDURE — 250N000011 HC RX IP 250 OP 636: Performed by: FAMILY MEDICINE

## 2021-12-17 PROCEDURE — 80053 COMPREHEN METABOLIC PANEL: CPT | Performed by: STUDENT IN AN ORGANIZED HEALTH CARE EDUCATION/TRAINING PROGRAM

## 2021-12-17 PROCEDURE — 120N000001 HC R&B MED SURG/OB

## 2021-12-17 PROCEDURE — 83735 ASSAY OF MAGNESIUM: CPT | Performed by: FAMILY MEDICINE

## 2021-12-17 PROCEDURE — 97110 THERAPEUTIC EXERCISES: CPT | Mod: GP

## 2021-12-17 PROCEDURE — 99232 SBSQ HOSP IP/OBS MODERATE 35: CPT | Mod: GC | Performed by: STUDENT IN AN ORGANIZED HEALTH CARE EDUCATION/TRAINING PROGRAM

## 2021-12-17 PROCEDURE — 250N000013 HC RX MED GY IP 250 OP 250 PS 637

## 2021-12-17 PROCEDURE — 258N000003 HC RX IP 258 OP 636: Performed by: FAMILY MEDICINE

## 2021-12-17 PROCEDURE — 97530 THERAPEUTIC ACTIVITIES: CPT | Mod: GP

## 2021-12-17 PROCEDURE — 97116 GAIT TRAINING THERAPY: CPT | Mod: GP

## 2021-12-17 PROCEDURE — 85027 COMPLETE CBC AUTOMATED: CPT | Performed by: STUDENT IN AN ORGANIZED HEALTH CARE EDUCATION/TRAINING PROGRAM

## 2021-12-17 RX ORDER — BUPROPION HYDROCHLORIDE 150 MG/1
150 TABLET ORAL DAILY
Status: DISCONTINUED | OUTPATIENT
Start: 2021-12-17 | End: 2021-12-17

## 2021-12-17 RX ORDER — FLUOXETINE 10 MG/1
10 CAPSULE ORAL DAILY
Status: DISCONTINUED | OUTPATIENT
Start: 2021-12-17 | End: 2021-12-21 | Stop reason: HOSPADM

## 2021-12-17 RX ORDER — ESCITALOPRAM OXALATE 5 MG/1
5 TABLET ORAL DAILY
Status: DISCONTINUED | OUTPATIENT
Start: 2021-12-17 | End: 2021-12-17

## 2021-12-17 RX ADMIN — ACAMPROSATE CALCIUM 666 MG: 333 TABLET, DELAYED RELEASE ORAL at 09:03

## 2021-12-17 RX ADMIN — ACETAMINOPHEN 975 MG: 325 TABLET ORAL at 20:55

## 2021-12-17 RX ADMIN — ACETAMINOPHEN 975 MG: 325 TABLET ORAL at 13:29

## 2021-12-17 RX ADMIN — FLUOXETINE 10 MG: 10 CAPSULE ORAL at 18:23

## 2021-12-17 RX ADMIN — ENOXAPARIN SODIUM 40 MG: 40 INJECTION SUBCUTANEOUS at 21:01

## 2021-12-17 RX ADMIN — THERA TABS 1 TABLET: TAB at 09:04

## 2021-12-17 RX ADMIN — ACAMPROSATE CALCIUM 666 MG: 333 TABLET, DELAYED RELEASE ORAL at 13:33

## 2021-12-17 RX ADMIN — THIAMINE HYDROCHLORIDE 250 MG: 100 INJECTION, SOLUTION INTRAMUSCULAR; INTRAVENOUS at 09:04

## 2021-12-17 RX ADMIN — ACAMPROSATE CALCIUM 666 MG: 333 TABLET, DELAYED RELEASE ORAL at 20:55

## 2021-12-17 RX ADMIN — FOLIC ACID 1 MG: 1 TABLET ORAL at 09:04

## 2021-12-17 ASSESSMENT — ACTIVITIES OF DAILY LIVING (ADL)
ADLS_ACUITY_SCORE: 19
ADLS_ACUITY_SCORE: 20
ADLS_ACUITY_SCORE: 19

## 2021-12-17 NOTE — PROGRESS NOTES
[facility] will have bed available on Tuesday 12/21.  will continue searching for availability.    MINO Salvador  12/17/2021  2:01 PM

## 2021-12-17 NOTE — PROGRESS NOTES
Progress Note    Subjective  Feeling good this morning. Has done some soul-searching during her hospital stay and has evaluated her relationship with alcohol, showing encouraging signs of reducing/eliminating her consumption of alcohol. Has concerns about approaching social functions after hospital discharge, but open to treatment and support.     Objective    Vital signs in last 24 hours Temp:  [97.7  F (36.5  C)-98.9  F (37.2  C)] 98  F (36.7  C)  Pulse:  [82-98] 82  Resp:  [18-20] 18  BP: (118-168)/(69-93) 138/77  SpO2:  [97 %-98 %] 97 %   Weight: [unfilled]    Intake/Output last 3 shift I/O last 3 completed shifts:  In: 1440 [P.O.:1440]  Out: -     Intake/Output this shift:I/O this shift:  In: 240 [P.O.:240]  Out: -     Physical Exam  General: alert, comfortable, sitting in chair eating breakfast  Chest: clear to auscultation  Cor: RRR, no murmurs, rubs, or gallops  Ext: 1+ pitting edema to knees bilaterally. 3cm in diameter scab on right anterior shin. Venous stasis changes bilaterally  Neuro: CN II-XII grossly intact. Strength 4/5 throughout, symmetric. No longer any horizontal nystagmus. Tangential round about answers to all questions      Pertinent Labs and Pertinent Radiology   Lab Results: personally reviewed.   K 3.8  Protein 5.7, albumin 2.3  Hgb 9.4, MCV 97        Recent Labs   Lab 12/17/21  0848   WBC 4.2   HGB 9.6*   HCT 28.5*          Recent Labs   Lab 12/17/21  0848      CO2 23   BUN 7*   ALBUMIN 2.5*   ALKPHOS 94   ALT 27   AST 30         Assessment/Plan  Autumn D Kenny is a 61 year old female with a history significant for falling, lumbar back injury, lumbar back pain, chronic leg swelling, hypertension, and alcohol use presenting for evaluation of generalized weakness and inability to get out of bed prior to arrival, admitted for failure to thrive and work-up of lower back pain.     Failure to thrive  Alcohol use disorder  Wernicke's Encephalopathy  Patient notes general weakness  that waxes and wanes in her arms and legs over the past few months. She recently started working from home and reports gaining weight and being less active. She recently started using a walker. She was unable to get out of bed the morning of 12/13, and per paramedics report, appeared unkempt as well as her apartment. She also endorses significant depression over the past year with losing her boyfriend and COVID, and becomes tearful when discussing her worries. Reporting 4-5 drinks per day for many years - LFTs reflective of chronic use, though INR and platelets normal so still has good synthetic function. Albumin slightly low. TSH normal. CT head 11/16/21 normal. MR C and L spine 12/14 unremarkable. MR brain 12/15 showing brain atrophy and chronic microvascular changes but otherwise normal. MM workup normal. Thiamine repletion started on 12/14 with marked improvement in cognition and neurologic function (Mini cog done 12/14 - score of 1/5, 12/15 mini cog was 5/5 after a day of high dose thiamine replacement). 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   - PT/OT - placement to TCU   - Continue to check Mg and Phos and replace accordingly - at risk for refeeding  - Continue high dose thiamine, folic acid, multivitamin given chronic alcohol use. Plan to discharge with  - Agreed to start acamprosate 12/16 - 666mg TID  - Consider rule 25 assessment    Depression  Reports increased sleep, decreased appetite, decreased motivation and interest in activities she previously liked doing. Meets with a counselor through work every couple weeks. Is open to starting a medication for her mood. Denies symptoms of past manic episodes.  -Start fluoxetine 10mg 12/17 given least likely to prolong QT further  -Should repeat an EKG 12/18 to eval QT     Hx fall in October 2021  Lumbar back pain  Patient reports chronic lower  back pain for many years. Last October, while kicking clothes out of the way, she fell back onto her back and head. Work-up in the ED was unremarkable. XR of her back with moderate disc space narrowing at L4-L5 and mild-moderate facet arthropathy at L3-L4 and L4-L5. She was seen at the spine clinic on 12/02 with recommendation for MR. 12/14 MR Cervical spine was normal, no spinal stenosis; 12/14 MR Lumbar spine shows mild lumbar spondylosis without spinal canal stenosis.   - MR lumbar/cervical spine completed 12/14, normal read  - pain control: tylenol scheduled, oxycodone 5 mg q4h severe pain     Chronic peripheral edema  Patient does not report PND or orthopnea. BNP 11. CXR with normal size heart. Low suspicion for CHF. US lower extremity venous duplex negative for DVT. More likely dependent edema/venous stasis. Given elevated LFT's and history of increased alcohol use considering liver disease as a contributing factor as well.  - CMP     Shortness of breath  This has also been going on since January. She notes it may be worse with activity, but it highly varies. No orthopnea, PND, palpitations, lightheadedness, or associated chest pain with the SOB. She notes it happens when she thinks of things that worry her. D-dimer elevated to 0.78. CXR normal. CT without PE and no signs of acute disease. US lower extremity venous duplex negative for DVT. Seems mostly deconditioning related.     Splenic artery aneurysm  2.5 cm splenic artery aneurysm found on CT of chest. IR consulted with recommendation for outpatient splenic aneurysm embolization.  - IR consulted, recommend embolization outpatient  - Follow-up with IR at [facility] clinic after discharge.     Hypokalemia  In setting of poor PO.  -K replacement protocol    Hypomagnesemia   - Mg replacement protocol      Prolonged QTc  Qtc 539 on admission.  - avoid QT prolonging medications    Severe Protein-Calorie Malnutrition  Per RD eval  -Lyte replacement and monitoring as  above           Diet: Combination Diet Regular Diet Adult  DVT Prophylaxis: Enoxaparin (Lovenox) SQ  Fermin Catheter: Not present  Fluids: None  Central Lines: None  Code Status: No CPR- Do NOT Intubate    Precepted patient with Dr. eKon Caraballo  [facility]  Medical Student, MS3  10:38 AM, December 17, 2021       I was present with the medical student who participated in the service and in the documentation of this note. I have verified the history and personally performed the physical exam and medical decision making, and have verified the content of the note, which accurately reflects my assessment of the patient and the plan of care.       Taz Durán MD - PGY2  [facility] [place] [facility]  P: [phone]

## 2021-12-17 NOTE — PLAN OF CARE
Problem: Adult Inpatient Plan of Care  Goal: Plan of Care Review  Outcome: Improving     Problem: Adult Inpatient Plan of Care  Goal: Optimal Comfort and Wellbeing  Outcome: Improving     Problem: Muscle Strength Impairment  Goal: Improved Muscle Strength  Outcome: Improving     Problem: Pain Acute  Goal: Acceptable Pain Control and Functional Ability  Outcome: Improving   Denies pain, slept well, VSS.

## 2021-12-17 NOTE — PLAN OF CARE
Problem: Pain Acute  Goal: Acceptable Pain Control and Functional Ability  Outcome: Improving    Problem: Confusion Acute  Goal: Optimal Cognitive Function  Outcome: Improving    Problem: Muscle Strength Impairment  Goal: Improved Muscle Strength  Outcome: Improving    Pt denies pain, currently sitting up in chair with A1-2, walker and gait belt. Pt states strength is slowly improving.   Pt is on Mag, K+ and Phos protocols. All re checks in the morning.  Pt A&O  BLE edema  TCU vs Home with home care.   Started Prozac this afternoon.

## 2021-12-18 ENCOUNTER — APPOINTMENT (OUTPATIENT)
Dept: OCCUPATIONAL THERAPY | Facility: HOSPITAL | Age: 61
DRG: 640 | End: 2021-12-18
Payer: COMMERCIAL

## 2021-12-18 PROBLEM — F10.20 ALCOHOL USE DISORDER, MODERATE, DEPENDENCE (H): Status: ACTIVE | Noted: 2021-12-18

## 2021-12-18 LAB
ALBUMIN SERPL-MCNC: 2.4 G/DL (ref 3.5–5)
ALP SERPL-CCNC: 98 U/L (ref 45–120)
ALT SERPL W P-5'-P-CCNC: 30 U/L (ref 0–45)
ANION GAP SERPL CALCULATED.3IONS-SCNC: 8 MMOL/L (ref 5–18)
AST SERPL W P-5'-P-CCNC: 44 U/L (ref 0–40)
BILIRUB SERPL-MCNC: 0.3 MG/DL (ref 0–1)
BUN SERPL-MCNC: 7 MG/DL (ref 8–22)
CALCIUM SERPL-MCNC: 8.8 MG/DL (ref 8.5–10.5)
CHLORIDE BLD-SCNC: 108 MMOL/L (ref 98–107)
CO2 SERPL-SCNC: 24 MMOL/L (ref 22–31)
CREAT SERPL-MCNC: 0.6 MG/DL (ref 0.6–1.1)
ERYTHROCYTE [DISTWIDTH] IN BLOOD BY AUTOMATED COUNT: 14.6 % (ref 10–15)
GFR SERPL CREATININE-BSD FRML MDRD: >90 ML/MIN/1.73M2
GLUCOSE BLD-MCNC: 102 MG/DL (ref 70–125)
HCT VFR BLD AUTO: 29.6 % (ref 35–47)
HGB BLD-MCNC: 9.7 G/DL (ref 11.7–15.7)
MAGNESIUM SERPL-MCNC: 1.5 MG/DL (ref 1.8–2.6)
MCH RBC QN AUTO: 31.8 PG (ref 26.5–33)
MCHC RBC AUTO-ENTMCNC: 32.8 G/DL (ref 31.5–36.5)
MCV RBC AUTO: 97 FL (ref 78–100)
PHOSPHATE SERPL-MCNC: 3 MG/DL (ref 2.5–4.5)
PLATELET # BLD AUTO: 217 10E3/UL (ref 150–450)
POTASSIUM BLD-SCNC: 3.4 MMOL/L (ref 3.5–5)
POTASSIUM BLD-SCNC: 3.9 MMOL/L (ref 3.5–5)
PROT SERPL-MCNC: 5.8 G/DL (ref 6–8)
RBC # BLD AUTO: 3.05 10E6/UL (ref 3.8–5.2)
SODIUM SERPL-SCNC: 140 MMOL/L (ref 136–145)
WBC # BLD AUTO: 4.5 10E3/UL (ref 4–11)

## 2021-12-18 PROCEDURE — 250N000013 HC RX MED GY IP 250 OP 250 PS 637: Performed by: STUDENT IN AN ORGANIZED HEALTH CARE EDUCATION/TRAINING PROGRAM

## 2021-12-18 PROCEDURE — 36415 COLL VENOUS BLD VENIPUNCTURE: CPT | Performed by: STUDENT IN AN ORGANIZED HEALTH CARE EDUCATION/TRAINING PROGRAM

## 2021-12-18 PROCEDURE — 99232 SBSQ HOSP IP/OBS MODERATE 35: CPT | Mod: GC | Performed by: STUDENT IN AN ORGANIZED HEALTH CARE EDUCATION/TRAINING PROGRAM

## 2021-12-18 PROCEDURE — 120N000001 HC R&B MED SURG/OB

## 2021-12-18 PROCEDURE — 80053 COMPREHEN METABOLIC PANEL: CPT | Performed by: STUDENT IN AN ORGANIZED HEALTH CARE EDUCATION/TRAINING PROGRAM

## 2021-12-18 PROCEDURE — 36415 COLL VENOUS BLD VENIPUNCTURE: CPT | Performed by: FAMILY MEDICINE

## 2021-12-18 PROCEDURE — 84100 ASSAY OF PHOSPHORUS: CPT | Performed by: MASSAGE THERAPIST

## 2021-12-18 PROCEDURE — 97129 THER IVNTJ 1ST 15 MIN: CPT | Mod: GO

## 2021-12-18 PROCEDURE — 83735 ASSAY OF MAGNESIUM: CPT | Performed by: MASSAGE THERAPIST

## 2021-12-18 PROCEDURE — 250N000011 HC RX IP 250 OP 636: Performed by: FAMILY MEDICINE

## 2021-12-18 PROCEDURE — 85048 AUTOMATED LEUKOCYTE COUNT: CPT | Performed by: STUDENT IN AN ORGANIZED HEALTH CARE EDUCATION/TRAINING PROGRAM

## 2021-12-18 PROCEDURE — 250N000013 HC RX MED GY IP 250 OP 250 PS 637: Performed by: FAMILY MEDICINE

## 2021-12-18 PROCEDURE — 84132 ASSAY OF SERUM POTASSIUM: CPT | Performed by: FAMILY MEDICINE

## 2021-12-18 PROCEDURE — 258N000003 HC RX IP 258 OP 636: Performed by: FAMILY MEDICINE

## 2021-12-18 PROCEDURE — 250N000011 HC RX IP 250 OP 636: Performed by: STUDENT IN AN ORGANIZED HEALTH CARE EDUCATION/TRAINING PROGRAM

## 2021-12-18 PROCEDURE — 250N000013 HC RX MED GY IP 250 OP 250 PS 637

## 2021-12-18 RX ORDER — MAGNESIUM SULFATE 4 G/50ML
4 INJECTION INTRAVENOUS ONCE
Status: COMPLETED | OUTPATIENT
Start: 2021-12-18 | End: 2021-12-18

## 2021-12-18 RX ORDER — POTASSIUM CHLORIDE 1500 MG/1
40 TABLET, EXTENDED RELEASE ORAL ONCE
Status: COMPLETED | OUTPATIENT
Start: 2021-12-18 | End: 2021-12-18

## 2021-12-18 RX ADMIN — FOLIC ACID 1 MG: 1 TABLET ORAL at 08:20

## 2021-12-18 RX ADMIN — THIAMINE HYDROCHLORIDE 250 MG: 100 INJECTION, SOLUTION INTRAMUSCULAR; INTRAVENOUS at 08:23

## 2021-12-18 RX ADMIN — THERA TABS 1 TABLET: TAB at 08:20

## 2021-12-18 RX ADMIN — ACETAMINOPHEN 975 MG: 325 TABLET ORAL at 21:15

## 2021-12-18 RX ADMIN — ACAMPROSATE CALCIUM 666 MG: 333 TABLET, DELAYED RELEASE ORAL at 13:31

## 2021-12-18 RX ADMIN — MAGNESIUM SULFATE HEPTAHYDRATE 4 G: 80 INJECTION, SOLUTION INTRAVENOUS at 12:12

## 2021-12-18 RX ADMIN — POTASSIUM CHLORIDE 40 MEQ: 1500 TABLET, EXTENDED RELEASE ORAL at 12:12

## 2021-12-18 RX ADMIN — ENOXAPARIN SODIUM 40 MG: 40 INJECTION SUBCUTANEOUS at 21:15

## 2021-12-18 RX ADMIN — ACAMPROSATE CALCIUM 666 MG: 333 TABLET, DELAYED RELEASE ORAL at 21:20

## 2021-12-18 RX ADMIN — FLUOXETINE 10 MG: 10 CAPSULE ORAL at 08:19

## 2021-12-18 RX ADMIN — ACAMPROSATE CALCIUM 666 MG: 333 TABLET, DELAYED RELEASE ORAL at 08:19

## 2021-12-18 RX ADMIN — ACETAMINOPHEN 975 MG: 325 TABLET ORAL at 04:38

## 2021-12-18 RX ADMIN — ACETAMINOPHEN 975 MG: 325 TABLET ORAL at 13:31

## 2021-12-18 ASSESSMENT — ACTIVITIES OF DAILY LIVING (ADL)
ADLS_ACUITY_SCORE: 19
ADLS_ACUITY_SCORE: 17
ADLS_ACUITY_SCORE: 17
ADLS_ACUITY_SCORE: 19
ADLS_ACUITY_SCORE: 21
ADLS_ACUITY_SCORE: 17
ADLS_ACUITY_SCORE: 19
ADLS_ACUITY_SCORE: 21
ADLS_ACUITY_SCORE: 19
ADLS_ACUITY_SCORE: 17
ADLS_ACUITY_SCORE: 19
ADLS_ACUITY_SCORE: 19
ADLS_ACUITY_SCORE: 17
ADLS_ACUITY_SCORE: 19
ADLS_ACUITY_SCORE: 17
ADLS_ACUITY_SCORE: 17
ADLS_ACUITY_SCORE: 19

## 2021-12-18 NOTE — PLAN OF CARE
Problem: Adult Inpatient Plan of Care  Goal: Plan of Care Review  Outcome: Improving     Problem: Pain Acute  Goal: Acceptable Pain Control and Functional Ability  Outcome: Improving    Problem: Confusion Acute  Goal: Optimal Cognitive Function  Outcome: Improving    Pt on Mag, K+ and Phos protocols. One dose K+ 40 mEq given, recheck at 1600. One bump IV mag given, recheck in AM. Phos re check in AM.  Pt A&O  Discharge: TCU or home with home care. To be decided.  Denies pain.

## 2021-12-18 NOTE — PROGRESS NOTES
Progress Note    Subjective  Doing well this morning. Still open to receiving support regarding ETOH reduction/elimination.    Objective    Vital signs in last 24 hours Temp:  [97.4  F (36.3  C)-98.7  F (37.1  C)] 97.8  F (36.6  C)  Pulse:  [84-98] 87  Resp:  [16-18] 18  BP: (129-184)/(60-89) 131/69  SpO2:  [96 %-99 %] 98 %   Weight: [unfilled]    Intake/Output last 3 shift I/O last 3 completed shifts:  In: 600 [P.O.:600]  Out: -     Intake/Output this shift:I/O this shift:  In: 480 [P.O.:480]  Out: -     Physical Exam  General: alert, comfortable, no acute distress  Chest: clear to auscultation  Cor: RRR, no murmurs, rubs, or gallops  Ext: 1+ pitting edema to knees bilaterally. 3cm in diameter scab on right anterior shin. Venous stasis changes bilaterally  Neuro: CN II-XII grossly intact. Strength 4/5 throughout, symmetric.     Pertinent Labs and Pertinent Radiology   Recent Labs   Lab 12/18/21  1028   WBC 4.5   HGB 9.7*   HCT 29.6*          Recent Labs   Lab 12/18/21  1028      CO2 24   BUN 7*   ALBUMIN 2.4*   ALKPHOS 98   ALT 30   AST 44*         Assessment/Plan  Autumn D Kenny is a 61 year old female with a history significant for falling, lumbar back injury, lumbar back pain, chronic leg swelling, hypertension, and alcohol use presenting for evaluation of generalized weakness and inability to get out of bed prior to arrival, admitted for failure to thrive and work-up of lower back pain.     Failure to thrive  Alcohol use disorder  Wernicke's Encephalopathy  Patient notes general weakness that waxes and wanes in her arms and legs over the past few months. She recently started working from home and reports gaining weight and being less active. She recently started using a walker. She was unable to get out of bed the morning of 12/13, and per paramedics report, appeared unkempt as well as her apartment. She also endorses significant depression over the past year with losing her boyfriend and COVID,  and becomes tearful when discussing her worries. Reporting 4-5 drinks per day for many years - LFTs reflective of chronic use, though INR and platelets normal so still has good synthetic function. Albumin slightly low. TSH normal. CT head 11/16/21 normal. MR C and L spine 12/14 unremarkable. MR brain 12/15 showing brain atrophy and chronic microvascular changes but otherwise normal. MM workup normal. Thiamine repletion started on 12/14 with marked improvement in cognition and neurologic function (Mini cog done 12/14 - score of 1/5, 12/15 mini cog was 5/5 after a day of high dose thiamine replacement). 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   - PT/OT - placement to TCU   - Continue to check Mg and Phos and replace accordingly - at risk for refeeding  - Continue high dose thiamine, folic acid, multivitamin given chronic alcohol use.  - Agreed to start acamprosate 12/16 - 666mg TID  - Consider rule 25 assessment    Depression  Reports increased sleep, decreased appetite, decreased motivation and interest in activities she previously liked doing. Meets with a counselor through work every couple weeks. Is open to starting a medication for her mood. Denies symptoms of past manic episodes.  -Start fluoxetine 10mg 12/17 given least likely to prolong QT further     Hx fall in October 2021  Lumbar back pain  Patient reports chronic lower back pain for many years. Last October, while kicking clothes out of the way, she fell back onto her back and head. Work-up in the ED was unremarkable. XR of her back with moderate disc space narrowing at L4-L5 and mild-moderate facet arthropathy at L3-L4 and L4-L5. She was seen at the spine clinic on 12/02 with recommendation for MR. 12/14 MR Cervical spine was normal, no spinal stenosis; 12/14 MR Lumbar spine shows mild lumbar spondylosis without spinal canal stenosis.   -  "MR lumbar/cervical spine completed 12/14, normal read  - pain control: tylenol scheduled, oxycodone 5 mg q4h severe pain     Chronic peripheral edema  Patient does not report PND or orthopnea. BNP 11. CXR with normal size heart. Low suspicion for CHF. US lower extremity venous duplex negative for DVT. More likely dependent edema/venous stasis. Given elevated LFT's and history of increased alcohol use considering liver disease as a contributing factor as well.     Shortness of breath  This has also been going on since January. She notes it may be worse with activity, but it highly varies. No orthopnea, PND, palpitations, lightheadedness, or associated chest pain with the SOB. She notes it happens when she thinks of things that worry her. D-dimer elevated to 0.78. CXR normal. CT without PE and no signs of acute disease. US lower extremity venous duplex negative for DVT. Seems mostly deconditioning related.     Splenic artery aneurysm  2.5 cm splenic artery aneurysm found on CT of chest. IR consulted with recommendation for outpatient splenic aneurysm embolization.  - IR consulted, recommend embolization outpatient  - Follow-up with IR at [facility] clinic after discharge.     Hypokalemia  In setting of poor PO.  -K replacement protocol    Hypomagnesemia   - Mg replacement protocol      Prolonged QTc  Qtc 539 on admission.  - avoid QT prolonging medications    Severe Protein-Calorie Malnutrition  Per RD eval  -Lyte replacement and monitoring as above           Diet: Combination Diet Regular Diet Adult  DVT Prophylaxis: Enoxaparin (Lovenox) SQ  Fermin Catheter: Not present  Fluids: None  Central Lines: None  Code Status: No CPR- Do NOT Intubate    Saranya \"Genny\" MD [place]  [facility] - [place] Resident  P: [phone]    "

## 2021-12-18 NOTE — PLAN OF CARE
Problem: Adult Inpatient Plan of Care  Goal: Absence of Hospital-Acquired Illness or Injury  Intervention: Identify and Manage Fall Risk  Recent Flowsheet Documentation  Taken 12/18/2021 0000 by Lorenza Naik, RN  Safety Promotion/Fall Prevention:   lighting adjusted   nonskid shoes/slippers when out of bed  Taken 12/17/2021 2030 by Lorenza Naik, RN  Safety Promotion/Fall Prevention:   lighting adjusted   nonskid shoes/slippers when out of bed   Pt assist of one with use of a walker and gait belt to bedside commode.  Weakness observed.  One episode of watery diarrhea.      Denies pain or discomfort.    Lorenza Naik, RN

## 2021-12-19 ENCOUNTER — APPOINTMENT (OUTPATIENT)
Dept: PHYSICAL THERAPY | Facility: HOSPITAL | Age: 61
DRG: 640 | End: 2021-12-19
Payer: COMMERCIAL

## 2021-12-19 ENCOUNTER — APPOINTMENT (OUTPATIENT)
Dept: OCCUPATIONAL THERAPY | Facility: HOSPITAL | Age: 61
DRG: 640 | End: 2021-12-19
Payer: COMMERCIAL

## 2021-12-19 LAB
ALBUMIN SERPL-MCNC: 2.4 G/DL (ref 3.5–5)
ALP SERPL-CCNC: 96 U/L (ref 45–120)
ALT SERPL W P-5'-P-CCNC: 33 U/L (ref 0–45)
ANION GAP SERPL CALCULATED.3IONS-SCNC: 9 MMOL/L (ref 5–18)
AST SERPL W P-5'-P-CCNC: 42 U/L (ref 0–40)
BILIRUB SERPL-MCNC: 0.3 MG/DL (ref 0–1)
BUN SERPL-MCNC: 8 MG/DL (ref 8–22)
CALCIUM SERPL-MCNC: 8.3 MG/DL (ref 8.5–10.5)
CHLORIDE BLD-SCNC: 109 MMOL/L (ref 98–107)
CO2 SERPL-SCNC: 22 MMOL/L (ref 22–31)
CREAT SERPL-MCNC: 0.53 MG/DL (ref 0.6–1.1)
ERYTHROCYTE [DISTWIDTH] IN BLOOD BY AUTOMATED COUNT: 15 % (ref 10–15)
GFR SERPL CREATININE-BSD FRML MDRD: >90 ML/MIN/1.73M2
GLUCOSE BLD-MCNC: 94 MG/DL (ref 70–125)
HCT VFR BLD AUTO: 27.5 % (ref 35–47)
HGB BLD-MCNC: 9 G/DL (ref 11.7–15.7)
MAGNESIUM SERPL-MCNC: 2 MG/DL (ref 1.8–2.6)
MCH RBC QN AUTO: 31.8 PG (ref 26.5–33)
MCHC RBC AUTO-ENTMCNC: 32.7 G/DL (ref 31.5–36.5)
MCV RBC AUTO: 97 FL (ref 78–100)
PHOSPHATE SERPL-MCNC: 3.5 MG/DL (ref 2.5–4.5)
PLATELET # BLD AUTO: 247 10E3/UL (ref 150–450)
POTASSIUM BLD-SCNC: 3.5 MMOL/L (ref 3.5–5)
PROT SERPL-MCNC: 5.7 G/DL (ref 6–8)
RBC # BLD AUTO: 2.83 10E6/UL (ref 3.8–5.2)
SODIUM SERPL-SCNC: 140 MMOL/L (ref 136–145)
WBC # BLD AUTO: 4.8 10E3/UL (ref 4–11)

## 2021-12-19 PROCEDURE — 250N000011 HC RX IP 250 OP 636: Performed by: STUDENT IN AN ORGANIZED HEALTH CARE EDUCATION/TRAINING PROGRAM

## 2021-12-19 PROCEDURE — 120N000001 HC R&B MED SURG/OB

## 2021-12-19 PROCEDURE — 97110 THERAPEUTIC EXERCISES: CPT | Mod: GO

## 2021-12-19 PROCEDURE — 83735 ASSAY OF MAGNESIUM: CPT | Performed by: FAMILY MEDICINE

## 2021-12-19 PROCEDURE — 36415 COLL VENOUS BLD VENIPUNCTURE: CPT | Performed by: STUDENT IN AN ORGANIZED HEALTH CARE EDUCATION/TRAINING PROGRAM

## 2021-12-19 PROCEDURE — 99231 SBSQ HOSP IP/OBS SF/LOW 25: CPT | Mod: GC | Performed by: STUDENT IN AN ORGANIZED HEALTH CARE EDUCATION/TRAINING PROGRAM

## 2021-12-19 PROCEDURE — 80053 COMPREHEN METABOLIC PANEL: CPT | Performed by: STUDENT IN AN ORGANIZED HEALTH CARE EDUCATION/TRAINING PROGRAM

## 2021-12-19 PROCEDURE — 97530 THERAPEUTIC ACTIVITIES: CPT | Mod: GO

## 2021-12-19 PROCEDURE — 250N000013 HC RX MED GY IP 250 OP 250 PS 637: Performed by: STUDENT IN AN ORGANIZED HEALTH CARE EDUCATION/TRAINING PROGRAM

## 2021-12-19 PROCEDURE — 85041 AUTOMATED RBC COUNT: CPT | Performed by: STUDENT IN AN ORGANIZED HEALTH CARE EDUCATION/TRAINING PROGRAM

## 2021-12-19 PROCEDURE — 84100 ASSAY OF PHOSPHORUS: CPT | Performed by: FAMILY MEDICINE

## 2021-12-19 PROCEDURE — 999N000127 HC STATISTIC PERIPHERAL IV START W US GUIDANCE

## 2021-12-19 PROCEDURE — 250N000013 HC RX MED GY IP 250 OP 250 PS 637

## 2021-12-19 RX ORDER — THIAMINE HYDROCHLORIDE 100 MG/ML
250 INJECTION, SOLUTION INTRAMUSCULAR; INTRAVENOUS DAILY
Status: ACTIVE | OUTPATIENT
Start: 2021-12-20 | End: 2021-12-21

## 2021-12-19 RX ADMIN — ENOXAPARIN SODIUM 40 MG: 40 INJECTION SUBCUTANEOUS at 22:17

## 2021-12-19 RX ADMIN — Medication 200 MG: at 10:47

## 2021-12-19 RX ADMIN — ACAMPROSATE CALCIUM 666 MG: 333 TABLET, DELAYED RELEASE ORAL at 08:29

## 2021-12-19 RX ADMIN — THERA TABS 1 TABLET: TAB at 08:29

## 2021-12-19 RX ADMIN — FOLIC ACID 1 MG: 1 TABLET ORAL at 08:29

## 2021-12-19 RX ADMIN — ACAMPROSATE CALCIUM 666 MG: 333 TABLET, DELAYED RELEASE ORAL at 13:49

## 2021-12-19 RX ADMIN — ACETAMINOPHEN 975 MG: 325 TABLET ORAL at 22:17

## 2021-12-19 RX ADMIN — FLUOXETINE 10 MG: 10 CAPSULE ORAL at 08:29

## 2021-12-19 RX ADMIN — ACAMPROSATE CALCIUM 666 MG: 333 TABLET, DELAYED RELEASE ORAL at 22:17

## 2021-12-19 RX ADMIN — ACETAMINOPHEN 975 MG: 325 TABLET ORAL at 13:49

## 2021-12-19 ASSESSMENT — ACTIVITIES OF DAILY LIVING (ADL)
ADLS_ACUITY_SCORE: 21
ADLS_ACUITY_SCORE: 19
ADLS_ACUITY_SCORE: 21
ADLS_ACUITY_SCORE: 19
ADLS_ACUITY_SCORE: 19
ADLS_ACUITY_SCORE: 21
ADLS_ACUITY_SCORE: 19
ADLS_ACUITY_SCORE: 21
ADLS_ACUITY_SCORE: 19
ADLS_ACUITY_SCORE: 21
ADLS_ACUITY_SCORE: 19

## 2021-12-19 NOTE — PLAN OF CARE
"Problem: Adult Inpatient Plan of Care  Goal: Plan of Care Review  Outcome: Improving    Problem: Muscle Strength Impairment  Goal: Improved Muscle Strength  Outcome: Improving    Problem: Pain Acute  Goal: Acceptable Pain Control and Functional Ability  Outcome: Improving    Pt is awaiting decision to discharge to TCU or home with homecare. Pt is working with OT/PT but declined working with PT this morning as she was to \"emotionally drained\" and just needed to talk. Patient stated Physical Therapy would maybe stop back this afternoon.   Mag, K+ and Phos protocols are all re checks in the AM.  Denies pain.           "

## 2021-12-19 NOTE — PROGRESS NOTES
[facility] Progress Note    Assessment/Plan  Active Problems:    Muscle weakness (generalized)    Anasarca    Hyponatremia    Hypoalbuminemia    Splenic artery aneurysm (H)    Dyspnea on exertion    At risk for falling    Prolonged QT interval    Nonspecific ST-T wave electrocardiographic changes    Failure to thrive in adult    Ulcer of right lower extremity, unspecified ulcer stage (H)    Hypertension, unspecified type    Alcohol use disorder, moderate, dependence (H)      Assessment/Plan  Autumn D Kenny is a 61 year old female with a history significant for falling, lumbar back injury, lumbar back pain, chronic leg swelling, hypertension, and alcohol use presenting for evaluation of generalized weakness and inability to get out of bed prior to arrival, admitted for failure to thrive and work-up of lower back pain.     Failure to thrive  Alcohol use disorder  Wernicke's Encephalopathy  Patient noted general weakness that waxed and waned in her arms and legs over the past few months. Recently started working from home and reports gaining weight and being less active secondary to this. Recently started using a walker. She was unable to get out of bed 12/13, and per paramedics report, appeared unkempt as well and her apartment was quite messy. She also endorses significant depression over the past year with losing her boyfriend and COVID, and becomes tearful when discussing her worries. Reporting 4-5 drinks per day for many years - LFTs reflective of chronic use, though INR and platelets normal so still has good synthetic function on admission. Albumin slightly low. TSH normal. CT head 11/16/21 normal. MR C and L spine 12/14 unremarkable. MR brain 12/15 showing brain atrophy and chronic microvascular changes but otherwise normal. MM workup normal. Thiamine repletion started on 12/14 with marked improvement in cognition and neurologic function (Mini cog done 12/14 - score of 1/5, 12/15 mini cog was  5/5 after a day of high dose thiamine replacement). 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   - PT/OT - placement to TCU, may have bed on 12/21  - Continue to check Mg and Phos and replace accordingly - at risk for refeeding  - Continue high dose thiamine, folic acid, multivitamin given chronic alcohol use.  - IV infiltrated overnight, received oral thiamine dose as we awaited placement of IV again due to being a difficult stick  - Agreed to start acamprosate 12/16 - 666mg TID  - Underwent rule 25 eval on 12/16     Depression  Reports increased sleep, decreased appetite, decreased motivation and interest in activities she previously liked doing. Meets with a counselor through work every couple weeks. Is open to starting a medication for her mood. Denies symptoms of past manic episodes.  -Started fluoxetine 10mg on 12/17 given least likely to prolong QT further  -Will need to follow up in outpatient setting regarding this  -Stressed the importance of follow up and that it takes several weeks to help  -Also encouraged pursuing therapy in the outpatient setting to help with this     Hx fall in October 2021  Lumbar back pain  Patient reports chronic lower back pain for many years. Last October, while kicking clothes out of the way, she fell back onto her back and head. Work-up in the ED was unremarkable. XR of her back with moderate disc space narrowing at L4-L5 and mild-moderate facet arthropathy at L3-L4 and L4-L5. She was seen at the spine clinic on 12/02 with recommendation for MR. 12/14 MR Cervical spine was normal, no spinal stenosis; 12/14 MR Lumbar spine shows mild lumbar spondylosis without spinal canal stenosis.   - MR lumbar/cervical spine completed 12/14, normal read  - pain control: tylenol scheduled, oxycodone 5 mg q4h severe pain  - Can follow up in outpatient setting     Chronic peripheral  edema  Patient does not report PND or orthopnea. BNP 11. CXR with normal size heart. Low suspicion for CHF. US lower extremity venous duplex negative for DVT. More likely dependent edema/venous stasis. Given elevated LFT's and history of increased alcohol use considering liver disease as a contributing factor as well.     Shortness of breath  This has also been going on since 1/202. She notes it may be worse with activity, but it highly varies. No orthopnea, PND, palpitations, lightheadedness, or associated chest pain with the SOB. She notes it happens when she thinks of things that worry her. D-dimer elevated to 0.78. CXR normal. CT without PE and no signs of acute disease. US lower extremity venous duplex negative for DVT. Seems mostly deconditioning related.  -Will monitor and treat as needed     Splenic artery aneurysm  2.5 cm splenic artery aneurysm found on CT of chest. IR consulted with recommendation for outpatient splenic aneurysm embolization.  - IR consulted, recommend embolization outpatient  - Follow-up with IR at [facility] clinic after discharge.      Hypokalemia  In setting of poor PO.  -K replacement protocol     Hypomagnesemia   - Mg replacement protocol      Prolonged QTc  Qtc 539 on admission.  - avoid QT prolonging medications     Severe Protein-Calorie Malnutrition  Per RD eval  -Lyte replacement and monitoring as above     Diet: Combination Diet Regular Diet Adult  DVT Prophylaxis: Enoxaparin (Lovenox) SQ  Fermin Catheter: Not present  Fluids: None  Central Lines: None  Code Status: No CPR- Do NOT Intubate    Subjective  Patient continues to feel better.  Feels like she needs a little rest today after pushing herself for several days.  Otherwise feeling well without any acute concerns.      Objective    Vital signs in last 24 hours Temp:  [97.6  F (36.4  C)-98.3  F (36.8  C)] 97.9  F (36.6  C)  Pulse:  [78-90] 78  Resp:  [16-18] 16  BP: (131-152)/(69-78) 152/72  SpO2:  [96 %-98 %] 98 %        Intake/Output last 3 shift I/O last 3 completed shifts:  In: 1440 [P.O.:1440]  Out: -     Intake/Output this shift:No intake/output data recorded.    Physical Exam  General: alert, comfortable, no acute distress  Chest: clear to auscultation  Cor: RRR, no murmurs, rubs, or gallops  Ext: 1+ pitting edema to knees bilaterally. 3cm in diameter scab on right anterior shin. Venous stasis changes bilaterally  Neuro: CN II-XII grossly intact. Strength 4/5 throughout, symmetric.     Pertinent Labs and Pertinent Radiology   Lab Results: personally reviewed.     Radiology Results: Personally reviewed image/s and impression/s    Precepted patient with Dr. Brionna Mcneil.    José Miguel Mcpherson MD  [facility], PGY-3  Pager # [phone]

## 2021-12-19 NOTE — PLAN OF CARE
Problem: Adult Inpatient Plan of Care  Goal: Plan of Care Review  Outcome: Improving  Goal: Patient-Specific Goal (Individualized)  Outcome: Improving  Goal: Absence of Hospital-Acquired Illness or Injury  Outcome: Improving  Intervention: Identify and Manage Fall Risk  Recent Flowsheet Documentation  Taken 12/19/2021 0300 by Nathaly Anthony RN  Safety Promotion/Fall Prevention:   nonskid shoes/slippers when out of bed   bed alarm on  Intervention: Prevent Skin Injury  Recent Flowsheet Documentation  Taken 12/19/2021 0300 by Nathaly Anthony RN  Body Position: position changed independently  Goal: Optimal Comfort and Wellbeing  Outcome: Improving  Goal: Readiness for Transition of Care  Outcome: Improving     Problem: Muscle Strength Impairment  Goal: Improved Muscle Strength  Outcome: Improving     Problem: Pain Acute  Goal: Acceptable Pain Control and Functional Ability  Outcome: Improving   Pt denied pain throughout the shift, refused scheduled tylenol this am.  Problem: Confusion Acute  Goal: Optimal Cognitive Function  Outcome: Improving     Problem: OT General Care Plan  Goal: Transfer (OT)  Description: Transfer (OT)  Outcome: Improving  Goal: Cognitive (OT)  Description: Cognitive (OT)  Outcome: Improving

## 2021-12-19 NOTE — PLAN OF CARE
Problem: Pain Acute  Goal: Acceptable Pain Control and Functional Ability  Outcome: Improving     Problem: Confusion Acute  Goal: Optimal Cognitive Function  Outcome: Improving     Problem: Muscle Strength Impairment  Goal: Improved Muscle Strength  Outcome: Improving   Pt is alert oriented x4. VSS. Denies pain or discomfort. Pt reports feeling much stronger today. Assist of 2 to BSC and back to bed. Pt tolerated transfers very well.  Pt voided and also had an extra large soft BM. 1600 K+ lab draw came back 3.9 with no replacement. Cont with plan of care and recheck K+ in am.

## 2021-12-20 ENCOUNTER — APPOINTMENT (OUTPATIENT)
Dept: PHYSICAL THERAPY | Facility: HOSPITAL | Age: 61
DRG: 640 | End: 2021-12-20
Payer: COMMERCIAL

## 2021-12-20 ENCOUNTER — APPOINTMENT (OUTPATIENT)
Dept: OCCUPATIONAL THERAPY | Facility: HOSPITAL | Age: 61
DRG: 640 | End: 2021-12-20
Payer: COMMERCIAL

## 2021-12-20 LAB
ALBUMIN SERPL-MCNC: 2.5 G/DL (ref 3.5–5)
ALP SERPL-CCNC: 89 U/L (ref 45–120)
ALT SERPL W P-5'-P-CCNC: 31 U/L (ref 0–45)
ANION GAP SERPL CALCULATED.3IONS-SCNC: 9 MMOL/L (ref 5–18)
AST SERPL W P-5'-P-CCNC: 34 U/L (ref 0–40)
BILIRUB SERPL-MCNC: 0.4 MG/DL (ref 0–1)
BUN SERPL-MCNC: 7 MG/DL (ref 8–22)
CALCIUM SERPL-MCNC: 8.6 MG/DL (ref 8.5–10.5)
CHLORIDE BLD-SCNC: 108 MMOL/L (ref 98–107)
CO2 SERPL-SCNC: 24 MMOL/L (ref 22–31)
CREAT SERPL-MCNC: 0.57 MG/DL (ref 0.6–1.1)
ERYTHROCYTE [DISTWIDTH] IN BLOOD BY AUTOMATED COUNT: 15.3 % (ref 10–15)
GFR SERPL CREATININE-BSD FRML MDRD: >90 ML/MIN/1.73M2
GLUCOSE BLD-MCNC: 92 MG/DL (ref 70–125)
HCT VFR BLD AUTO: 29.1 % (ref 35–47)
HGB BLD-MCNC: 9.5 G/DL (ref 11.7–15.7)
MAGNESIUM SERPL-MCNC: 1.9 MG/DL (ref 1.8–2.6)
MCH RBC QN AUTO: 32.1 PG (ref 26.5–33)
MCHC RBC AUTO-ENTMCNC: 32.6 G/DL (ref 31.5–36.5)
MCV RBC AUTO: 98 FL (ref 78–100)
PHOSPHATE SERPL-MCNC: 4.2 MG/DL (ref 2.5–4.5)
PLATELET # BLD AUTO: 255 10E3/UL (ref 150–450)
POTASSIUM BLD-SCNC: 3.4 MMOL/L (ref 3.5–5)
POTASSIUM BLD-SCNC: 4.1 MMOL/L (ref 3.5–5)
PROT SERPL-MCNC: 5.9 G/DL (ref 6–8)
RBC # BLD AUTO: 2.96 10E6/UL (ref 3.8–5.2)
SODIUM SERPL-SCNC: 141 MMOL/L (ref 136–145)
WBC # BLD AUTO: 5.4 10E3/UL (ref 4–11)

## 2021-12-20 PROCEDURE — 97530 THERAPEUTIC ACTIVITIES: CPT | Mod: GP

## 2021-12-20 PROCEDURE — 80053 COMPREHEN METABOLIC PANEL: CPT | Performed by: STUDENT IN AN ORGANIZED HEALTH CARE EDUCATION/TRAINING PROGRAM

## 2021-12-20 PROCEDURE — 250N000011 HC RX IP 250 OP 636: Performed by: STUDENT IN AN ORGANIZED HEALTH CARE EDUCATION/TRAINING PROGRAM

## 2021-12-20 PROCEDURE — 99232 SBSQ HOSP IP/OBS MODERATE 35: CPT | Mod: GC | Performed by: STUDENT IN AN ORGANIZED HEALTH CARE EDUCATION/TRAINING PROGRAM

## 2021-12-20 PROCEDURE — 97110 THERAPEUTIC EXERCISES: CPT | Mod: GO

## 2021-12-20 PROCEDURE — 83735 ASSAY OF MAGNESIUM: CPT | Performed by: FAMILY MEDICINE

## 2021-12-20 PROCEDURE — 97535 SELF CARE MNGMENT TRAINING: CPT | Mod: GO

## 2021-12-20 PROCEDURE — 85027 COMPLETE CBC AUTOMATED: CPT | Performed by: STUDENT IN AN ORGANIZED HEALTH CARE EDUCATION/TRAINING PROGRAM

## 2021-12-20 PROCEDURE — 250N000013 HC RX MED GY IP 250 OP 250 PS 637

## 2021-12-20 PROCEDURE — 84100 ASSAY OF PHOSPHORUS: CPT | Performed by: FAMILY MEDICINE

## 2021-12-20 PROCEDURE — 250N000013 HC RX MED GY IP 250 OP 250 PS 637: Performed by: STUDENT IN AN ORGANIZED HEALTH CARE EDUCATION/TRAINING PROGRAM

## 2021-12-20 PROCEDURE — 84132 ASSAY OF SERUM POTASSIUM: CPT | Performed by: FAMILY MEDICINE

## 2021-12-20 PROCEDURE — 250N000013 HC RX MED GY IP 250 OP 250 PS 637: Performed by: FAMILY MEDICINE

## 2021-12-20 PROCEDURE — 120N000001 HC R&B MED SURG/OB

## 2021-12-20 PROCEDURE — 36415 COLL VENOUS BLD VENIPUNCTURE: CPT | Performed by: FAMILY MEDICINE

## 2021-12-20 PROCEDURE — 36415 COLL VENOUS BLD VENIPUNCTURE: CPT | Performed by: STUDENT IN AN ORGANIZED HEALTH CARE EDUCATION/TRAINING PROGRAM

## 2021-12-20 RX ORDER — POTASSIUM CHLORIDE 1500 MG/1
40 TABLET, EXTENDED RELEASE ORAL ONCE
Status: COMPLETED | OUTPATIENT
Start: 2021-12-20 | End: 2021-12-20

## 2021-12-20 RX ADMIN — FOLIC ACID 1 MG: 1 TABLET ORAL at 08:36

## 2021-12-20 RX ADMIN — ACAMPROSATE CALCIUM 666 MG: 333 TABLET, DELAYED RELEASE ORAL at 08:36

## 2021-12-20 RX ADMIN — POTASSIUM CHLORIDE 40 MEQ: 1500 TABLET, EXTENDED RELEASE ORAL at 10:35

## 2021-12-20 RX ADMIN — ACAMPROSATE CALCIUM 666 MG: 333 TABLET, DELAYED RELEASE ORAL at 14:03

## 2021-12-20 RX ADMIN — ACAMPROSATE CALCIUM 666 MG: 333 TABLET, DELAYED RELEASE ORAL at 21:16

## 2021-12-20 RX ADMIN — Medication 200 MG: at 08:36

## 2021-12-20 RX ADMIN — ACETAMINOPHEN 975 MG: 325 TABLET ORAL at 06:04

## 2021-12-20 RX ADMIN — FLUOXETINE 10 MG: 10 CAPSULE ORAL at 08:36

## 2021-12-20 RX ADMIN — ENOXAPARIN SODIUM 40 MG: 40 INJECTION SUBCUTANEOUS at 21:16

## 2021-12-20 RX ADMIN — ACETAMINOPHEN 975 MG: 325 TABLET ORAL at 14:05

## 2021-12-20 RX ADMIN — ACETAMINOPHEN 975 MG: 325 TABLET ORAL at 21:16

## 2021-12-20 RX ADMIN — THERA TABS 1 TABLET: TAB at 08:36

## 2021-12-20 ASSESSMENT — MIFFLIN-ST. JEOR: SCORE: 1722.57

## 2021-12-20 ASSESSMENT — ACTIVITIES OF DAILY LIVING (ADL)
ADLS_ACUITY_SCORE: 19
ADLS_ACUITY_SCORE: 17
ADLS_ACUITY_SCORE: 19
ADLS_ACUITY_SCORE: 17
ADLS_ACUITY_SCORE: 19

## 2021-12-20 NOTE — CONSULTS
"Spiritual Assessment:     Encouraged by her seven days of sobriety    Progressing in accepting her addiction/identity    Showing courage and finding freedom in talking honestly about her addiction    Reports support/encouragement from select individuals    No firm plans for treatment at this point/looking into support groups    Church; finds encouragement in Scripture; trusting that God is working in her life in the mist of her struggle    Care Provided:   Empathic listening and presence  Helped patient in processing of emotions  Normalized/validated her feelings of shame, longing and hopefulness  Discussed coping strategies and resources  Affirmed her steps towards sobriety and health  Encouraged honest conversations with herself and others    Full Spiritual Care Note: Spiritual Care Consult. \"Nasrin\" easily engaged in conversation, sharing that her drinking in the last year has increased to the point that it is now a problem. She states that she is usually an outgoing person, but in recent months has kept to herself, stayed home, partially to hide her drinking. Nasrin shares that she is tired of her addiction and knows that she needs to make changes. She is encouraged by the steps she has taken to be honest about her addiction with herself and others. She is not totally comfortable with the term \"alcoholic\", but when I offer up \"functional alcoholic\" she is accepting of that. Nasrin shares that she has a family history of alcoholism and was determined that she would not continue this family trait. She expresses disappointment with herself that she was not \"strong enough\" to resist alcohol. At the same time, she is encouraged by the stories she has heard from others of their struggles and victories with sobriety.     When asked about her plans to deal with her addiction, Nasrin talked about going to a TCU to regain her physical strength. When asked what she plans to do to maintain her sobriety when she returns home, she " says she is planning on looking into local support groups. I encouraged her efforts to do this, noting that encouragement and support are a critical piece of maintaining sobriety. Nasrin acknowledges that she may need to end some of her current friendships to support her efforts in this area.     Nasrin is Buddhism and has been watching [employer] online with some girlfriends. She talked about her concerns about how God may view her failures, but also notes that she experiences His compassion and forgiveness as well. I encouraged her to keep this in mind when she disappoints herself in the future. She is hopeful that God will use her experiences to help others in the future. She welcomed my offer of a Bible and a devotional.     Visit Length: 40 minutes    Plan of Care: Will remain available for further support as patient/family needs/desires.    Mady Summers M.Div.  Staff   [phone]

## 2021-12-20 NOTE — PLAN OF CARE
Care Management Discharge Note    Discharge Date: 12/21/2021  Expected Time of Departure: 1:00pm    Discharge Disposition: Transitional Care    Discharge Services:      Discharge DME:      Discharge Transportation: health plan transportation    Private pay costs discussed: Not applicable    PAS Confirmation Code:  [id number])  Patient/family educated on Medicare website which has current facility and service quality ratings: yes    Education Provided on the Discharge Plan:    Persons Notified of Discharge Plans: Pt  Patient/Family in Agreement with the Plan:  y    Handoff Referral Completed: Yes    Additional Information:  CM met with pt to discuss acceptance at [facility]. Pt is agreeable. Pt requested wheelchair transport. Ride set up for 1:00pm    MINO Arana

## 2021-12-20 NOTE — PROGRESS NOTES
[facility] Progress Note    Assessment/Plan  Active Problems:    Muscle weakness (generalized)    Anasarca    Hyponatremia    Hypoalbuminemia    Splenic artery aneurysm (H)    Dyspnea on exertion    At risk for falling    Prolonged QT interval    Nonspecific ST-T wave electrocardiographic changes    Failure to thrive in adult    Ulcer of right lower extremity, unspecified ulcer stage (H)    Hypertension, unspecified type    Alcohol use disorder, moderate, dependence (H)      Assessment/Plan  Autumn D Kenny is a 61 year old female with a history significant for falling, lumbar back injury, lumbar back pain, chronic leg swelling, hypertension, and alcohol use presenting for evaluation of generalized weakness and inability to get out of bed prior to arrival, admitted for failure to thrive and work-up of lower back pain.     Failure to thrive  Alcohol use disorder  Wernicke's Encephalopathy  Patient noted general weakness that waxed and waned in her arms and legs over the past few months. Recently started working from home and reports gaining weight and being less active secondary to this. Recently started using a walker. She was unable to get out of bed 12/13, and per paramedics report, appeared unkempt as well and her apartment was quite messy. She also endorses significant depression over the past year with losing her boyfriend and COVID, and becomes tearful when discussing her worries. Reporting 4-5 drinks per day for many years - LFTs reflective of chronic use, though INR and platelets normal so still has good synthetic function on admission. Albumin slightly low. TSH normal. CT head 11/16/21 normal. MR C and L spine 12/14 unremarkable. MR brain 12/15 showing brain atrophy and chronic microvascular changes but otherwise normal. MM workup normal. Thiamine repletion started on 12/14 with marked improvement in cognition and neurologic function (Mini cog done 12/14 - score of 1/5, 12/15 mini cog was  5/5 after a day of high dose thiamine replacement). 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   - PT/OT - placement to TCU, has bed on 12/21  - Continue to check Mg and Phos and replace accordingly - at risk for refeeding  - Continue high dose thiamine, folic acid, multivitamin given chronic alcohol use.  - IV infiltrated overnight, received oral thiamine dose as we awaited placement of IV again due to being a difficult stick  - Agreed to start acamprosate 12/16 - 666mg TID  - Consult  for rule 25     Depression  Reports increased sleep, decreased appetite, decreased motivation and interest in activities she previously liked doing. Meets with a counselor through work every couple weeks. Is open to starting a medication for her mood. Denies symptoms of past manic episodes.  -Started fluoxetine 10mg on 12/17 given least likely to prolong QT further  -Will need to follow up in outpatient setting regarding this  -Stressed the importance of follow up and that it takes several weeks to help  -Also encouraged pursuing therapy in the outpatient setting to help with this     Hx fall in October 2021  Lumbar back pain  Patient reports chronic lower back pain for many years. Last October, while kicking clothes out of the way, she fell back onto her back and head. Work-up in the ED was unremarkable. XR of her back with moderate disc space narrowing at L4-L5 and mild-moderate facet arthropathy at L3-L4 and L4-L5. She was seen at the spine clinic on 12/02 with recommendation for MR. 12/14 MR Cervical spine was normal, no spinal stenosis; 12/14 MR Lumbar spine shows mild lumbar spondylosis without spinal canal stenosis.   - MR lumbar/cervical spine completed 12/14, normal read  - pain control: tylenol scheduled, oxycodone 5 mg q4h severe pain  - Can follow up in outpatient setting     Chronic peripheral  edema  Patient does not report PND or orthopnea. BNP 11. CXR with normal size heart. Low suspicion for CHF. US lower extremity venous duplex negative for DVT. More likely dependent edema/venous stasis. Given elevated LFT's and history of increased alcohol use considering liver disease as a contributing factor as well.     Shortness of breath  This has also been going on since 1/202. She notes it may be worse with activity, but it highly varies. No orthopnea, PND, palpitations, lightheadedness, or associated chest pain with the SOB. She notes it happens when she thinks of things that worry her. D-dimer elevated to 0.78. CXR normal. CT without PE and no signs of acute disease. US lower extremity venous duplex negative for DVT. Seems mostly deconditioning related.  -Will monitor and treat as needed     Splenic artery aneurysm  2.5 cm splenic artery aneurysm found on CT of chest. IR consulted with recommendation for outpatient splenic aneurysm embolization.  - IR consulted, recommend embolization outpatient  - Follow-up with IR at [facility] clinic after discharge.      Hypokalemia  In setting of poor PO.  -K replacement protocol     Hypomagnesemia   - Mg replacement protocol      Prolonged QTc  Qtc 539 on admission.  - avoid QT prolonging medications     Severe Protein-Calorie Malnutrition  Per RD eval  -Lyte replacement and monitoring as above     Diet: Combination Diet Regular Diet Adult  DVT Prophylaxis: Enoxaparin (Lovenox) SQ  Fermin Catheter: Not present  Fluids: None  Central Lines: None  Code Status: No CPR- Do NOT Intubate    Subjective  Patient continues to feel better.  Continuing to have good and open conversations with her brothers about her alcohol use. Is planning to have them take all alcohol out of her house. Will have SW touch base with her today about rule 25.    Objective    Vital signs in last 24 hours Temp:  [97.5  F (36.4  C)-98.1  F (36.7  C)] 98.1  F (36.7  C)  Pulse:  [75-97] 75  Resp:  [16-18]  18  BP: (128-147)/(68-70) 147/70  SpO2:  [96 %-98 %] 98 %       Intake/Output last 3 shift I/O last 3 completed shifts:  In: 720 [P.O.:720]  Out: -     Intake/Output this shift:No intake/output data recorded.    Physical Exam  General: alert, comfortable, no acute distress  Chest: clear to auscultation  Cor: RRR, no murmurs, rubs, or gallops  Ext: 1+ pitting edema to knees bilaterally. 3cm in diameter scab on right anterior shin. Venous stasis changes bilaterally  Neuro: CN II-XII grossly intact. Strength 4/5 throughout, symmetric.     Pertinent Labs and Pertinent Radiology   Lab Results: personally reviewed.     Radiology Results: Personally reviewed image/s and impression/s    Precepted patient with Dr. Daysi Durán MD - PGY2  [facility]  P: [phone]

## 2021-12-20 NOTE — PROGRESS NOTES
"CLINICAL NUTRITION SERVICES - REASSESSMENT NOTE     Nutrition Prescription    RECOMMENDATIONS FOR MDs/PROVIDERS TO ORDER:  none    Malnutrition Status:    Severe    Recommendations already ordered by Registered Dietitian (RD):  RD to sign off    Future/Additional Recommendations:  None     EVALUATION OF THE PROGRESS TOWARD GOALS   Diet: Regular  Intake: Good, > 75% of meals.   Ordering 3 meals/day at 2000-27648 kcal, 80-88 g protein/day    Ordered less Kcal but maintained protein yesterday at 1240 kcal, 81 g protein  Good fluid intake, 1440 ml/day    Pt is meeting 100% of estimated needs most days     ANTHROPOMETRICS  Height: 170.2 cm (5' 7\")  Most Recent Weight: 110.7kg (244 lb 3.2 oz) 12/16, question accuracy- up 14 lb from day prior. Had been stable  104.7 kg (230 lb 14.4 oz)  12/15  IBW: 61.3 kg  BMI: Obesity Grade II BMI 35-39.9  (fluid accumulation)  Weight History:       Wt Readings from Last 3 Encounters:   12/13/21 104.3 kg (230 lb)   11/16/21 124.7 kg (275 lb)   09/14/21 124.7 kg (275 lb)      Pt reports her weight varies from 180 -190 lb and up    Dosing Weight: 72.1 kg  Adjusted BW     ASSESSED NUTRITION NEEDS  Estimated Energy Needs: 2200 kcals/day (30 kcal/kg)  Justification: Obese  Estimated Protein Needs: 72-87 grams protein/day (1 - 1.2 grams of pro/kg)  Justification: Repletion  Estimated Fluid Needs: 1800- 2200 mL/day (25 - 30 mL/kg), or per provider   Justification: Maintenance      PHYSICAL FINDINGS  See malnutrition section below.    GI   Last bm 12/18 large soft    LABS  Reviewed  K+ 3.4, decreased- on replacement protocol  Mag and phos WNL     MEDICATIONS  Reviewed  Folic acid, mvi, thiamine  K+ replacement today    MALNUTRITION:  % Weight Loss:  > 5% in 1 month (severe malnutrition)  % Intake:  </= 75% for >/= 1 month (severe malnutrition)  Subcutaneous Fat Loss:  None noted  Muscle Loss:  None noted  Fluid Retention:  Moderate      Malnutrition Diagnosis: Severe malnutrition  In Context of: "  Environmental or social circumstances    Previous Goals   Electrolytes WDL  Evaluation: Not met, progressing    Previous Nutrition Diagnosis  Malnutrition related to environmental and social circumstances as evidenced by >5% weight loss in one month estimated <=75% of energy needs for >= one month, fluid accumulation    Evaluation: Improving    CURRENT NUTRITION DIAGNOSIS  Malnutrition related to environmental and social circumstances as evidenced by >5% weight loss in one month estimated <=75% of energy needs for >= one month, fluid accumulation      INTERVENTIONS  Implementation  Will  Sign off. Pt is tolerating diet and outside of window of refeeding    Goals  Electrolytes WNL- actively being replaced and monitored by MD    Monitoring/Evaluation  Progress toward goals will be monitored and evaluated per protocol.

## 2021-12-20 NOTE — PLAN OF CARE
Problem: Muscle Strength Impairment  Goal: Improved Muscle Strength  Outcome: Improving     Problem: Confusion Acute  Goal: Optimal Cognitive Function  Outcome: Improving     Patient slept well overnight. Reports anxiety about weakness but is making progress toward strengthening. Used pivot with assist to use bedside commode for toileting. Patient was continent overnight.     Satish Orellana RN

## 2021-12-21 ENCOUNTER — APPOINTMENT (OUTPATIENT)
Dept: OCCUPATIONAL THERAPY | Facility: HOSPITAL | Age: 61
DRG: 640 | End: 2021-12-21
Payer: COMMERCIAL

## 2021-12-21 VITALS
HEIGHT: 67 IN | RESPIRATION RATE: 20 BRPM | OXYGEN SATURATION: 98 % | DIASTOLIC BLOOD PRESSURE: 71 MMHG | HEART RATE: 80 BPM | SYSTOLIC BLOOD PRESSURE: 155 MMHG | WEIGHT: 245.8 LBS | BODY MASS INDEX: 38.58 KG/M2 | TEMPERATURE: 98 F

## 2021-12-21 LAB
ALBUMIN SERPL-MCNC: 2.4 G/DL (ref 3.5–5)
ALP SERPL-CCNC: 86 U/L (ref 45–120)
ALT SERPL W P-5'-P-CCNC: 27 U/L (ref 0–45)
ANION GAP SERPL CALCULATED.3IONS-SCNC: 7 MMOL/L (ref 5–18)
AST SERPL W P-5'-P-CCNC: 32 U/L (ref 0–40)
BILIRUB SERPL-MCNC: 0.4 MG/DL (ref 0–1)
BUN SERPL-MCNC: 5 MG/DL (ref 8–22)
CALCIUM SERPL-MCNC: 9.2 MG/DL (ref 8.5–10.5)
CHLORIDE BLD-SCNC: 109 MMOL/L (ref 98–107)
CO2 SERPL-SCNC: 25 MMOL/L (ref 22–31)
CREAT SERPL-MCNC: 0.54 MG/DL (ref 0.6–1.1)
ERYTHROCYTE [DISTWIDTH] IN BLOOD BY AUTOMATED COUNT: 15.3 % (ref 10–15)
GFR SERPL CREATININE-BSD FRML MDRD: >90 ML/MIN/1.73M2
GLUCOSE BLD-MCNC: 89 MG/DL (ref 70–125)
GLUCOSE BLDC GLUCOMTR-MCNC: 79 MG/DL (ref 70–99)
HCT VFR BLD AUTO: 28.5 % (ref 35–47)
HGB BLD-MCNC: 9.5 G/DL (ref 11.7–15.7)
MAGNESIUM SERPL-MCNC: 1.8 MG/DL (ref 1.8–2.6)
MCH RBC QN AUTO: 32.6 PG (ref 26.5–33)
MCHC RBC AUTO-ENTMCNC: 33.3 G/DL (ref 31.5–36.5)
MCV RBC AUTO: 98 FL (ref 78–100)
PHOSPHATE SERPL-MCNC: 4.8 MG/DL (ref 2.5–4.5)
PLATELET # BLD AUTO: 271 10E3/UL (ref 150–450)
POTASSIUM BLD-SCNC: 3.8 MMOL/L (ref 3.5–5)
PROT SERPL-MCNC: 5.7 G/DL (ref 6–8)
RBC # BLD AUTO: 2.91 10E6/UL (ref 3.8–5.2)
SARS-COV-2 RNA RESP QL NAA+PROBE: NEGATIVE
SODIUM SERPL-SCNC: 141 MMOL/L (ref 136–145)
WBC # BLD AUTO: 4.5 10E3/UL (ref 4–11)

## 2021-12-21 PROCEDURE — 36415 COLL VENOUS BLD VENIPUNCTURE: CPT | Performed by: STUDENT IN AN ORGANIZED HEALTH CARE EDUCATION/TRAINING PROGRAM

## 2021-12-21 PROCEDURE — 84100 ASSAY OF PHOSPHORUS: CPT | Performed by: FAMILY MEDICINE

## 2021-12-21 PROCEDURE — 87635 SARS-COV-2 COVID-19 AMP PRB: CPT | Performed by: STUDENT IN AN ORGANIZED HEALTH CARE EDUCATION/TRAINING PROGRAM

## 2021-12-21 PROCEDURE — 250N000013 HC RX MED GY IP 250 OP 250 PS 637

## 2021-12-21 PROCEDURE — 80053 COMPREHEN METABOLIC PANEL: CPT | Performed by: STUDENT IN AN ORGANIZED HEALTH CARE EDUCATION/TRAINING PROGRAM

## 2021-12-21 PROCEDURE — 99238 HOSP IP/OBS DSCHRG MGMT 30/<: CPT | Mod: GC | Performed by: STUDENT IN AN ORGANIZED HEALTH CARE EDUCATION/TRAINING PROGRAM

## 2021-12-21 PROCEDURE — 83735 ASSAY OF MAGNESIUM: CPT | Performed by: FAMILY MEDICINE

## 2021-12-21 PROCEDURE — 250N000013 HC RX MED GY IP 250 OP 250 PS 637: Performed by: STUDENT IN AN ORGANIZED HEALTH CARE EDUCATION/TRAINING PROGRAM

## 2021-12-21 PROCEDURE — 97535 SELF CARE MNGMENT TRAINING: CPT | Mod: GO

## 2021-12-21 PROCEDURE — U0005 INFEC AGEN DETEC AMPLI PROBE: HCPCS | Mod: ORL | Performed by: FAMILY MEDICINE

## 2021-12-21 PROCEDURE — 85027 COMPLETE CBC AUTOMATED: CPT | Performed by: STUDENT IN AN ORGANIZED HEALTH CARE EDUCATION/TRAINING PROGRAM

## 2021-12-21 RX ORDER — FLUOXETINE 10 MG/1
10 CAPSULE ORAL DAILY
Qty: 30 CAPSULE | Refills: 1
Start: 2021-12-21 | End: 2021-12-22

## 2021-12-21 RX ORDER — LANOLIN ALCOHOL/MO/W.PET/CERES
3 CREAM (GRAM) TOPICAL
Qty: 30 TABLET | Refills: 0
Start: 2021-12-21 | End: 2021-12-22

## 2021-12-21 RX ORDER — ACAMPROSATE CALCIUM 333 MG/1
666 TABLET, DELAYED RELEASE ORAL 3 TIMES DAILY
Qty: 180 TABLET | Refills: 1
Start: 2021-12-21 | End: 2021-12-22

## 2021-12-21 RX ORDER — MULTIVITAMIN,THERAPEUTIC
1 TABLET ORAL DAILY
Qty: 90 TABLET | Refills: 1
Start: 2021-12-21 | End: 2021-12-22

## 2021-12-21 RX ORDER — ACETAMINOPHEN 325 MG/1
975 TABLET ORAL EVERY 8 HOURS PRN
Qty: 30 TABLET | Refills: 1
Start: 2021-12-21 | End: 2021-12-22

## 2021-12-21 RX ORDER — FOLIC ACID 1 MG/1
1 TABLET ORAL DAILY
Qty: 30 TABLET | Refills: 1
Start: 2021-12-21 | End: 2021-12-22

## 2021-12-21 RX ORDER — LANOLIN ALCOHOL/MO/W.PET/CERES
100 CREAM (GRAM) TOPICAL DAILY
Qty: 30 TABLET | Refills: 1
Start: 2021-12-21 | End: 2021-12-22

## 2021-12-21 RX ORDER — POLYETHYLENE GLYCOL 3350 17 G/17G
17 POWDER, FOR SOLUTION ORAL DAILY
Qty: 510 G | Refills: 0
Start: 2021-12-21 | End: 2021-12-22

## 2021-12-21 RX ADMIN — FLUOXETINE 10 MG: 10 CAPSULE ORAL at 09:12

## 2021-12-21 RX ADMIN — THERA TABS 1 TABLET: TAB at 09:11

## 2021-12-21 RX ADMIN — ACAMPROSATE CALCIUM 666 MG: 333 TABLET, DELAYED RELEASE ORAL at 12:54

## 2021-12-21 RX ADMIN — ACAMPROSATE CALCIUM 666 MG: 333 TABLET, DELAYED RELEASE ORAL at 09:12

## 2021-12-21 RX ADMIN — ACETAMINOPHEN 975 MG: 325 TABLET ORAL at 12:22

## 2021-12-21 RX ADMIN — FOLIC ACID 1 MG: 1 TABLET ORAL at 09:12

## 2021-12-21 RX ADMIN — ACETAMINOPHEN 975 MG: 325 TABLET ORAL at 05:13

## 2021-12-21 RX ADMIN — Medication 200 MG: at 09:11

## 2021-12-21 ASSESSMENT — ACTIVITIES OF DAILY LIVING (ADL)
ADLS_ACUITY_SCORE: 17
ADLS_ACUITY_SCORE: 15
ADLS_ACUITY_SCORE: 15
ADLS_ACUITY_SCORE: 17
ADLS_ACUITY_SCORE: 23
ADLS_ACUITY_SCORE: 17
ADLS_ACUITY_SCORE: 23
ADLS_ACUITY_SCORE: 17

## 2021-12-21 NOTE — DISCHARGE INSTRUCTIONS
Splenic aneurysm: recommend embolization in the next 1-2 months. Patient to have clinic visit prior to IR procedure with Dr. North in [facility]; [facility] schedulers with call you to schedule this clinic visit.   Call [facility] at [phone] with questions/concerns or if you have any of the above symptoms.

## 2021-12-21 NOTE — PLAN OF CARE
Physical Therapy Discharge Summary    Reason for therapy discharge:    Discharged to transitional care facility.    Progress towards therapy goal(s). See goals on Care Plan in [facility] electronic health record for goal details.  Goals partially met.  Barriers to achieving goals:   discharge from facility.    Therapy recommendation(s):    Continued therapy is recommended.  Rationale/Recommendations:  at TCU.

## 2021-12-21 NOTE — PLAN OF CARE
[id number] Shift Summary     A&Ox4. VSS. Pt pleasant. Denies pain. Discharge to TCU tomorrow at 1300. Wearing incontinent brief.    Asha Maddox, RN    Problem: Adult Inpatient Plan of Care  Goal: Plan of Care Review  Outcome: No Change     Problem: Pain Acute  Goal: Acceptable Pain Control and Functional Ability  Outcome: No Change     Problem: Confusion Acute  Goal: Optimal Cognitive Function  Outcome: Improving     Problem: Adult Inpatient Plan of Care  Goal: Absence of Hospital-Acquired Illness or Injury  Intervention: Prevent Skin Injury  Recent Flowsheet Documentation  Taken 12/20/2021 2116 by Asha Maddox, RN  Body Position: position changed independently

## 2021-12-21 NOTE — PLAN OF CARE
Problem: Adult Inpatient Plan of Care  Goal: Absence of Hospital-Acquired Illness or Injury  Intervention: Identify and Manage Fall Risk  Recent Flowsheet Documentation  Taken 12/21/2021 0016 by Indy Burt RN  Safety Promotion/Fall Prevention: safety round/check completed     Problem: Pain Acute  Goal: Acceptable Pain Control and Functional Ability  Outcome: Improving     Problem: Discharge Planning  Goal: Discharge Planning (Adult, OB, Behavioral, Peds)  Outcome: Improving    Denies pain. Wearing incontinent briefs. Discharge to TCU today at 1300. Call light within reach, continue to monitor.

## 2021-12-21 NOTE — PLAN OF CARE
Problem: Adult Inpatient Plan of Care  Goal: Plan of Care Review  Outcome: Improving   Pt. Excited about her impending transfer to a TCU. Discussed ETOH abuse and addiction with staff. Denies pain. Up in the chair for dinner.

## 2021-12-21 NOTE — PROGRESS NOTES
The patient is alert and oriented. Denied any pain nor difficulty of breathing. Afebrile. For Discharge to TCU. AVS was explained and given to her. [facility] transport will be here to pick her up going to TCU. All belongings were given to patient. All due medications were given

## 2021-12-22 ENCOUNTER — TRANSITIONAL CARE UNIT VISIT (OUTPATIENT)
Dept: GERIATRICS | Facility: CLINIC | Age: 61
End: 2021-12-22
Payer: COMMERCIAL

## 2021-12-22 ENCOUNTER — PATIENT OUTREACH (OUTPATIENT)
Dept: CARE COORDINATION | Facility: CLINIC | Age: 61
End: 2021-12-22

## 2021-12-22 ENCOUNTER — PATIENT OUTREACH (OUTPATIENT)
Dept: FAMILY MEDICINE | Facility: CLINIC | Age: 61
End: 2021-12-22

## 2021-12-22 ENCOUNTER — LAB REQUISITION (OUTPATIENT)
Dept: LAB | Facility: CLINIC | Age: 61
End: 2021-12-22
Payer: COMMERCIAL

## 2021-12-22 VITALS
OXYGEN SATURATION: 90 % | DIASTOLIC BLOOD PRESSURE: 65 MMHG | HEIGHT: 67 IN | BODY MASS INDEX: 38.61 KG/M2 | TEMPERATURE: 98.2 F | RESPIRATION RATE: 16 BRPM | WEIGHT: 246 LBS | HEART RATE: 68 BPM | SYSTOLIC BLOOD PRESSURE: 126 MMHG

## 2021-12-22 DIAGNOSIS — F32.A DEPRESSION, UNSPECIFIED DEPRESSION TYPE: ICD-10-CM

## 2021-12-22 DIAGNOSIS — U07.1 COVID-19: ICD-10-CM

## 2021-12-22 DIAGNOSIS — R62.7 FAILURE TO THRIVE IN ADULT: Primary | ICD-10-CM

## 2021-12-22 DIAGNOSIS — R94.31 PROLONGED QT INTERVAL: ICD-10-CM

## 2021-12-22 DIAGNOSIS — Z71.89 OTHER SPECIFIED COUNSELING: ICD-10-CM

## 2021-12-22 DIAGNOSIS — E43 SEVERE PROTEIN-CALORIE MALNUTRITION (H): ICD-10-CM

## 2021-12-22 DIAGNOSIS — F10.20 ALCOHOL USE DISORDER, MODERATE, DEPENDENCE (H): ICD-10-CM

## 2021-12-22 DIAGNOSIS — M54.50 LUMBAR BACK PAIN: ICD-10-CM

## 2021-12-22 DIAGNOSIS — I72.8 SPLENIC ARTERY ANEURYSM (H): ICD-10-CM

## 2021-12-22 LAB — SARS-COV-2 RNA RESP QL NAA+PROBE: NEGATIVE

## 2021-12-22 PROCEDURE — 99310 SBSQ NF CARE HIGH MDM 45: CPT | Performed by: NURSE PRACTITIONER

## 2021-12-22 RX ORDER — ACETAMINOPHEN 325 MG/1
975 TABLET ORAL EVERY 8 HOURS PRN
Qty: 30 TABLET | Refills: 1 | Status: CANCELLED | OUTPATIENT
Start: 2021-12-22

## 2021-12-22 RX ORDER — MULTIVITAMIN,THERAPEUTIC
1 TABLET ORAL DAILY
Qty: 90 TABLET | Refills: 1 | Status: CANCELLED | OUTPATIENT
Start: 2021-12-22

## 2021-12-22 RX ORDER — LANOLIN ALCOHOL/MO/W.PET/CERES
100 CREAM (GRAM) TOPICAL DAILY
Qty: 30 TABLET | Refills: 1 | Status: CANCELLED | OUTPATIENT
Start: 2021-12-22

## 2021-12-22 RX ORDER — FOLIC ACID 1 MG/1
1 TABLET ORAL DAILY
Qty: 30 TABLET | Refills: 1 | Status: SHIPPED | OUTPATIENT
Start: 2021-12-22

## 2021-12-22 RX ORDER — FLUOXETINE 10 MG/1
10 CAPSULE ORAL DAILY
Qty: 30 CAPSULE | Refills: 1 | Status: CANCELLED | OUTPATIENT
Start: 2021-12-22

## 2021-12-22 RX ORDER — ACETAMINOPHEN 325 MG/1
975 TABLET ORAL EVERY 8 HOURS PRN
Qty: 30 TABLET | Refills: 1 | Status: SHIPPED | OUTPATIENT
Start: 2021-12-22

## 2021-12-22 RX ORDER — MULTIVITAMIN,THERAPEUTIC
1 TABLET ORAL DAILY
Qty: 90 TABLET | Refills: 1 | Status: SHIPPED | OUTPATIENT
Start: 2021-12-22

## 2021-12-22 RX ORDER — LANOLIN ALCOHOL/MO/W.PET/CERES
100 CREAM (GRAM) TOPICAL DAILY
Qty: 30 TABLET | Refills: 1 | Status: SHIPPED | OUTPATIENT
Start: 2021-12-22

## 2021-12-22 RX ORDER — ACAMPROSATE CALCIUM 333 MG/1
666 TABLET, DELAYED RELEASE ORAL 3 TIMES DAILY
Qty: 180 TABLET | Refills: 1 | Status: CANCELLED | OUTPATIENT
Start: 2021-12-22

## 2021-12-22 RX ORDER — POLYETHYLENE GLYCOL 3350 17 G/17G
17 POWDER, FOR SOLUTION ORAL DAILY
Qty: 510 G | Refills: 0 | Status: SHIPPED | OUTPATIENT
Start: 2021-12-22

## 2021-12-22 RX ORDER — FLUOXETINE 10 MG/1
10 CAPSULE ORAL DAILY
Qty: 30 CAPSULE | Refills: 1 | Status: SHIPPED | OUTPATIENT
Start: 2021-12-22

## 2021-12-22 RX ORDER — ACAMPROSATE CALCIUM 333 MG/1
666 TABLET, DELAYED RELEASE ORAL 3 TIMES DAILY
Qty: 180 TABLET | Refills: 1 | Status: SHIPPED | OUTPATIENT
Start: 2021-12-22

## 2021-12-22 RX ORDER — POLYETHYLENE GLYCOL 3350 17 G/17G
17 POWDER, FOR SOLUTION ORAL DAILY
Qty: 510 G | Refills: 0 | Status: CANCELLED | OUTPATIENT
Start: 2021-12-22

## 2021-12-22 RX ORDER — FOLIC ACID 1 MG/1
1 TABLET ORAL DAILY
Qty: 30 TABLET | Refills: 1 | Status: CANCELLED | OUTPATIENT
Start: 2021-12-22

## 2021-12-22 RX ORDER — LANOLIN ALCOHOL/MO/W.PET/CERES
3 CREAM (GRAM) TOPICAL
Qty: 30 TABLET | Refills: 0 | Status: CANCELLED | OUTPATIENT
Start: 2021-12-22

## 2021-12-22 RX ORDER — LANOLIN ALCOHOL/MO/W.PET/CERES
3 CREAM (GRAM) TOPICAL
Qty: 30 TABLET | Refills: 0 | Status: SHIPPED | OUTPATIENT
Start: 2021-12-22

## 2021-12-22 ASSESSMENT — MIFFLIN-ST. JEOR: SCORE: 1723.48

## 2021-12-22 NOTE — PROGRESS NOTES
Code Status:  FULL CODE  Visit Type: No chief complaint on file.     Facility:   No question data found.        History of Present Illness:   Hospital Admission Date: 12/13/2021 Hospital Discharge Date: 12/21/2021      Dafne Cox is a 61 year old female with a past medical history for Alcohol disorder, frequent falls, lumbago, peripheral edema, HTN, lumbar spondylosis with stenosis.  She was recently hospitalized for failure to thrive at home, weakness 2/2 thiamine deficiency due to ETOH abuse and depression.  She was started on high dose thiamine and cognitive scores showed some improvement.  She was continued on thiamine, folic acid, MVI and acamprosate.  For her depression she was started on fluoxetine due to prolonged QTc upon admission 539.  She will need a follow up EKG to monitor her QTc.     Of note; incidental finding on CT of chest showed a 2.5cm splenic artery aneurysm.  IR recommended outpatient splenic aneurysm emobolization.  Will need to follow up with after discharge.     Today, she is very talkative going from pressurized speech to weepy.  She is very candid about her drinking and does wish to maintain sobriety.  She does not see a psychotherapist.  She voices feeling positive about the progress she has made with her function.     No past medical history on file.  No past surgical history on file.  Family History   Problem Relation Age of Onset     Cancer Mother      Cancer Father      Social History     Socioeconomic History     Marital status: Single     Spouse name: Not on file     Number of children: Not on file     Years of education: Not on file     Highest education level: Not on file   Occupational History     Not on file   Tobacco Use     Smoking status: Never Smoker     Smokeless tobacco: Never Used   Substance and Sexual Activity     Alcohol use: Yes     Drug use: Not on file     Sexual activity: Not on file   Other Topics Concern     Parent/sibling w/ CABG, MI or angioplasty before  65F 55M? Not Asked   Social History Narrative     Not on file     Social Determinants of Health     Financial Resource Strain: Not on file   Food Insecurity: Not on file   Transportation Needs: Not on file   Physical Activity: Not on file   Stress: Not on file   Social Connections: Not on file   Intimate Partner Violence: Not on file   Housing Stability: Not on file       Current Outpatient Medications   Medication Sig Dispense Refill     acamprosate (CAMPRAL) 333 MG EC tablet Take 2 tablets (666 mg) by mouth 3 times daily 180 tablet 1     acetaminophen (TYLENOL) 325 MG tablet Take 3 tablets (975 mg) by mouth every 8 hours as needed for mild pain 30 tablet 1     FLUoxetine (PROZAC) 10 MG capsule Take 1 capsule (10 mg) by mouth daily 30 capsule 1     folic acid (FOLVITE) 1 MG tablet Take 1 tablet (1 mg) by mouth daily 30 tablet 1     melatonin 3 MG tablet Take 1 tablet (3 mg) by mouth nightly as needed for sleep 30 tablet 0     menthol-zinc oxide (CALMOSEPTINE) 0.44-20.6 % OINT ointment Apply topically 4 times daily as needed for skin protection 100 g 0     multivitamin, therapeutic (THERA-VIT) TABS tablet Take 1 tablet by mouth daily 90 tablet 1     polyethylene glycol (MIRALAX) 17 GM/Dose powder Take 17 g by mouth daily 510 g 0     thiamine (B-1) 100 MG tablet Take 1 tablet (100 mg) by mouth daily 30 tablet 1     No Known Allergies  Immunization History   Administered Date(s) Administered     COVID-19,PF,Pfizer (12+ Yrs) 05/25/2021, 06/16/2021       Medications list and allergies in the facility chart have been reviewed.  Please see facility EMR for most up to date list.       Review of Systems   Patient denies fever, chills, headache, lightheadedness, dizziness, rhinorrhea, cough, congestion, shortness of breath, chest pain, palpitations, abdominal pain, n/v, diarrhea, constipation, change in appetite, change in sleep pattern, dysuria, frequency, burning or pain with urination.  Other than stated in HPI all other  "review of systems is negative.       Physical Exam   Vital signs:/65   Pulse 68   Temp 98.2  F (36.8  C)   Resp 16   Ht 1.702 m (5' 7\")   Wt 111.6 kg (246 lb)   SpO2 90%   BMI 38.53 kg/m     GENERAL APPEARANCE: Well developed, obese woman in no acute distress.  HEENT: normocephalic, atraumatic  sclerae anicteric, conjunctivae clear and moist, EOM intact  LUNGS: Lung sounds CTA, no adventitious sounds, respiratory effort normal.  CARD: RRR, S1, S2, without murmurs, gallops, rubs  BACK: No kyphosis of the thoracic spine.   ABD: Soft, nondistended and nontender with normal bowel sounds.   MSK: Muscle strength and tone were equal bilaterally. Moves all extremities easily and intentionally.   EXTREMITIES: No cyanosis, clubbing or edema.  NEURO: Alert and oriented x 3.Face is symmetric.  SKIN: Inspection of the skin reveals no rashes, ulcerations or petechiae.  PSYCH: pressurized speech and weepy      Labs:    Last Comprehensive Metabolic Panel:  Sodium   Date Value Ref Range Status   12/21/2021 141 136 - 145 mmol/L Final     Potassium   Date Value Ref Range Status   12/21/2021 3.8 3.5 - 5.0 mmol/L Final     Chloride   Date Value Ref Range Status   12/21/2021 109 (H) 98 - 107 mmol/L Final     Carbon Dioxide (CO2)   Date Value Ref Range Status   12/21/2021 25 22 - 31 mmol/L Final     Anion Gap   Date Value Ref Range Status   12/21/2021 7 5 - 18 mmol/L Final     Glucose   Date Value Ref Range Status   12/21/2021 89 70 - 125 mg/dL Final     GLUCOSE BY METER POCT   Date Value Ref Range Status   12/21/2021 79 70 - 99 mg/dL Final     Urea Nitrogen   Date Value Ref Range Status   12/21/2021 5 (L) 8 - 22 mg/dL Final     Creatinine   Date Value Ref Range Status   12/21/2021 0.54 (L) 0.60 - 1.10 mg/dL Final     GFR Estimate   Date Value Ref Range Status   12/21/2021 >90 >60 mL/min/1.73m2 Final     Comment:     As of July 11, 2021, eGFR is calculated by the CKD-EPI creatinine equation, without race adjustment. eGFR can " be influenced by muscle mass, exercise, and diet. The reported eGFR is an estimation only and is only applicable if the renal function is stable.     Calcium   Date Value Ref Range Status   12/21/2021 9.2 8.5 - 10.5 mg/dL Final     Lab Results   Component Value Date    WBC 4.5 12/21/2021     Lab Results   Component Value Date    RBC 2.91 12/21/2021     Lab Results   Component Value Date    HGB 9.5 12/21/2021     Lab Results   Component Value Date    HCT 28.5 12/21/2021     Lab Results   Component Value Date    MCV 98 12/21/2021     Lab Results   Component Value Date    MCH 32.6 12/21/2021     Lab Results   Component Value Date    MCHC 33.3 12/21/2021     Lab Results   Component Value Date    RDW 15.3 12/21/2021     Lab Results   Component Value Date     12/21/2021           Assessment/plan:   Failure to thrive in adult  Good appetite, dietician to eval    Alcohol use disorder, moderate, dependence (H)  Counseled the importance of finding a psychotherapist.  We did discuss AA.  Continue on Acamprosate, b vitamins and MVI    Depression, unspecified depression type  Question some mary fluctuating with depression.  S/e of prozac can be akathisia so will monitor.  Continue with prozac.  On melatonin at HS    Lumbar back pain  Stable, follow up with Spine clinic.  Continue with apap    Splenic artery aneurysm (H)  Follow up with IR and MW clinic for embolization    Severe protein-calorie malnutrition (H)  Supplements, dietician to eval    Prolonged QT interval  Will need follow up EKG with hospitals       45 total minutes spent with 30 minutes spent with patient in counseling regarding her sobriety, process, need for ongoing therapy.  Lots of reassurance and support talk given    Electronically signed by: Katina Venegas NP

## 2021-12-22 NOTE — PROGRESS NOTES
Clinic Care Coordination Contact    Clinic Care Coordination Contact  OUTREACH    Referral Information:  Referral Source: Other, specify    Primary Diagnosis: Other (include Comment box)    Chief Complaint   Patient presents with     Clinic Care Coordination - Post Hospital     Failure to Thrive         [place] Utilization:      Utilization    Hospital Admissions  1             ED Visits  3             No Show Count (past year)  2                Current as of: 12/22/2021  1:35 PM              Clinical Concerns:  Current Medical Concerns:  No    Current Behavioral Concerns: No    Education Provided to patient: No      Health Maintenance Reviewed:    Clinical Pathway: Failure to drive     Medication Management:  Medication review status: Medications reviewed and no changes reported per patient.             Functional Status:  Bed or wheelchair confined:: No  Fallen 2 or more times in the past year?: Yes  Any fall with injury in the past year?: Yes    Living Situation:  Current living arrangement:: Not Assessed    Lifestyle & Psychosocial Needs:    Social Determinants of Health     Tobacco Use: Low Risk      Smoking Tobacco Use: Never Smoker     Smokeless Tobacco Use: Never Used   Alcohol Use: Not on file   Financial Resource Strain: Not on file   Food Insecurity: Not on file   Transportation Needs: Not on file   Physical Activity: Not on file   Stress: Not on file   Social Connections: Not on file   Intimate Partner Violence: Not on file   Depression: Not on file   Housing Stability: Not on file              Mental health DX:: No  Mental health management concern (GOAL):: No  Chemical Dependency Status: Past Concern             Resources and Interventions:  Current Resources:                                    Goals:       Patient/Caregiver understanding:     Outreach Frequency: 2 weeks  Future Appointments              In 1 week Cris Jacobo MB[employer] [facility]          Plan:i got a message from   about the pt, He wanted me to reach out to the pt and help her setup a hospital follow up. I called but got the pt vm, and left a vm for the pt to give me a call back.

## 2021-12-22 NOTE — PROGRESS NOTES
Clinic Care Coordination Contact    Background: Care Coordination referral placed from [facility] discharge report for reason of patient meeting criteria for a TCM outreach call by [facility] team.    Assessment: Upon chart review, [facility] Team member will cancel/close the referral for TCM outreach due to reason below:    Patient has a follow up appointment with an appropriate provider today for hospital discharge    Plan: Care Coordination referral for TCM outreach canceled.    Moni Soto  Community Health Worker  [facility]  [phone]

## 2021-12-22 NOTE — PROGRESS NOTES
I got a call back from the pt, pt stated that she is doing better. Pt stated that she was transferred to a TCU. Pt stated that she is not sure when she will be here till. She stated that it could be a week to three weeks. Pt stated that it also depends on how fast she recovers with her PT/OP. I told the pt to give me a call once she is discharged from the TCU, and I can help setup a follow up. Pt stated that she will call once she is discharged. Pt is not a current pt, but wants to come here to be seen by . I told the pt to give me a call when she is discharged.

## 2021-12-22 NOTE — LETTER
12/22/2021        RE: Autumn D Kenny  [address]  [place] [address]        Code Status:  FULL CODE  Visit Type: No chief complaint on file.     Facility:   No question data found.        History of Present Illness:   Hospital Admission Date: 12/13/2021 Hospital Discharge Date: 12/21/2021      Dafne Cox is a 61 year old female with a past medical history for Alcohol disorder, frequent falls, lumbago, peripheral edema, HTN, lumbar spondylosis with stenosis.  She was recently hospitalized for failure to thrive at home, weakness 2/2 thiamine deficiency due to ETOH abuse and depression.  She was started on high dose thiamine and cognitive scores showed some improvement.  She was continued on thiamine, folic acid, MVI and acamprosate.  For her depression she was started on fluoxetine due to prolonged QTc upon admission 539.  She will need a follow up EKG to monitor her QTc.     Of note; incidental finding on CT of chest showed a 2.5cm splenic artery aneurysm.  IR recommended outpatient splenic aneurysm emobolization.  Will need to follow up with after discharge.     Today, she is very talkative going from pressurized speech to weepy.  She is very candid about her drinking and does wish to maintain sobriety.  She does not see a psychotherapist.  She voices feeling positive about the progress she has made with her function.     No past medical history on file.  No past surgical history on file.  Family History   Problem Relation Age of Onset     Cancer Mother      Cancer Father      Social History     Socioeconomic History     Marital status: Single     Spouse name: Not on file     Number of children: Not on file     Years of education: Not on file     Highest education level: Not on file   Occupational History     Not on file   Tobacco Use     Smoking status: Never Smoker     Smokeless tobacco: Never Used   Substance and Sexual Activity     Alcohol use: Yes     Drug use: Not on file     Sexual  activity: Not on file   Other Topics Concern     Parent/sibling w/ CABG, MI or angioplasty before 65F 55M? Not Asked   Social History Narrative     Not on file     Social Determinants of Health     Financial Resource Strain: Not on file   Food Insecurity: Not on file   Transportation Needs: Not on file   Physical Activity: Not on file   Stress: Not on file   Social Connections: Not on file   Intimate Partner Violence: Not on file   Housing Stability: Not on file       Current Outpatient Medications   Medication Sig Dispense Refill     acamprosate (CAMPRAL) 333 MG EC tablet Take 2 tablets (666 mg) by mouth 3 times daily 180 tablet 1     acetaminophen (TYLENOL) 325 MG tablet Take 3 tablets (975 mg) by mouth every 8 hours as needed for mild pain 30 tablet 1     FLUoxetine (PROZAC) 10 MG capsule Take 1 capsule (10 mg) by mouth daily 30 capsule 1     folic acid (FOLVITE) 1 MG tablet Take 1 tablet (1 mg) by mouth daily 30 tablet 1     melatonin 3 MG tablet Take 1 tablet (3 mg) by mouth nightly as needed for sleep 30 tablet 0     menthol-zinc oxide (CALMOSEPTINE) 0.44-20.6 % OINT ointment Apply topically 4 times daily as needed for skin protection 100 g 0     multivitamin, therapeutic (THERA-VIT) TABS tablet Take 1 tablet by mouth daily 90 tablet 1     polyethylene glycol (MIRALAX) 17 GM/Dose powder Take 17 g by mouth daily 510 g 0     thiamine (B-1) 100 MG tablet Take 1 tablet (100 mg) by mouth daily 30 tablet 1     No Known Allergies  Immunization History   Administered Date(s) Administered     COVID-19,,Pfizer (12+ Yrs) 05/25/2021, 06/16/2021       Medications list and allergies in the facility chart have been reviewed.  Please see facility EMR for most up to date list.       Review of Systems   Patient denies fever, chills, headache, lightheadedness, dizziness, rhinorrhea, cough, congestion, shortness of breath, chest pain, palpitations, abdominal pain, n/v, diarrhea, constipation, change in appetite, change in sleep  "pattern, dysuria, frequency, burning or pain with urination.  Other than stated in HPI all other review of systems is negative.       Physical Exam   Vital signs:/65   Pulse 68   Temp 98.2  F (36.8  C)   Resp 16   Ht 1.702 m (5' 7\")   Wt 111.6 kg (246 lb)   SpO2 90%   BMI 38.53 kg/m     GENERAL APPEARANCE: Well developed, obese woman in no acute distress.  HEENT: normocephalic, atraumatic  sclerae anicteric, conjunctivae clear and moist, EOM intact  LUNGS: Lung sounds CTA, no adventitious sounds, respiratory effort normal.  CARD: RRR, S1, S2, without murmurs, gallops, rubs  BACK: No kyphosis of the thoracic spine.   ABD: Soft, nondistended and nontender with normal bowel sounds.   MSK: Muscle strength and tone were equal bilaterally. Moves all extremities easily and intentionally.   EXTREMITIES: No cyanosis, clubbing or edema.  NEURO: Alert and oriented x 3.Face is symmetric.  SKIN: Inspection of the skin reveals no rashes, ulcerations or petechiae.  PSYCH: pressurized speech and weepy      Labs:    Last Comprehensive Metabolic Panel:  Sodium   Date Value Ref Range Status   12/21/2021 141 136 - 145 mmol/L Final     Potassium   Date Value Ref Range Status   12/21/2021 3.8 3.5 - 5.0 mmol/L Final     Chloride   Date Value Ref Range Status   12/21/2021 109 (H) 98 - 107 mmol/L Final     Carbon Dioxide (CO2)   Date Value Ref Range Status   12/21/2021 25 22 - 31 mmol/L Final     Anion Gap   Date Value Ref Range Status   12/21/2021 7 5 - 18 mmol/L Final     Glucose   Date Value Ref Range Status   12/21/2021 89 70 - 125 mg/dL Final     GLUCOSE BY METER POCT   Date Value Ref Range Status   12/21/2021 79 70 - 99 mg/dL Final     Urea Nitrogen   Date Value Ref Range Status   12/21/2021 5 (L) 8 - 22 mg/dL Final     Creatinine   Date Value Ref Range Status   12/21/2021 0.54 (L) 0.60 - 1.10 mg/dL Final     GFR Estimate   Date Value Ref Range Status   12/21/2021 >90 >60 mL/min/1.73m2 Final     Comment:     As of July " 11, 2021, eGFR is calculated by the CKD-EPI creatinine equation, without race adjustment. eGFR can be influenced by muscle mass, exercise, and diet. The reported eGFR is an estimation only and is only applicable if the renal function is stable.     Calcium   Date Value Ref Range Status   12/21/2021 9.2 8.5 - 10.5 mg/dL Final     Lab Results   Component Value Date    WBC 4.5 12/21/2021     Lab Results   Component Value Date    RBC 2.91 12/21/2021     Lab Results   Component Value Date    HGB 9.5 12/21/2021     Lab Results   Component Value Date    HCT 28.5 12/21/2021     Lab Results   Component Value Date    MCV 98 12/21/2021     Lab Results   Component Value Date    MCH 32.6 12/21/2021     Lab Results   Component Value Date    MCHC 33.3 12/21/2021     Lab Results   Component Value Date    RDW 15.3 12/21/2021     Lab Results   Component Value Date     12/21/2021           Assessment/plan:   Failure to thrive in adult  Good appetite, dietician to eval    Alcohol use disorder, moderate, dependence (H)  Counseled the importance of finding a psychotherapist.  We did discuss AA.  Continue on Acamprosate, b vitamins and MVI    Depression, unspecified depression type  Question some mary fluctuating with depression.  S/e of prozac can be akathisia so will monitor.  Continue with prozac.  On melatonin at HS    Lumbar back pain  Stable, follow up with Spine clinic.  Continue with apap    Splenic artery aneurysm (H)  Follow up with IR and MW clinic for embolization    Severe protein-calorie malnutrition (H)  Supplements, dietician to eval    Prolonged QT interval  Will need follow up EKG with [facility]       45 total minutes spent with 30 minutes spent with patient in counseling regarding her sobriety, process, need for ongoing therapy.  Lots of reassurance and support talk given    Electronically signed by: Katina Venegas NP          Sincerely,        Katina Venegas NP

## 2021-12-22 NOTE — PATIENT INSTRUCTIONS

## 2021-12-24 ENCOUNTER — LAB REQUISITION (OUTPATIENT)
Dept: LAB | Facility: CLINIC | Age: 61
End: 2021-12-24
Payer: COMMERCIAL

## 2021-12-24 DIAGNOSIS — U07.1 COVID-19: ICD-10-CM

## 2021-12-24 LAB — SARS-COV-2 RNA RESP QL NAA+PROBE: NEGATIVE

## 2021-12-24 PROCEDURE — U0005 INFEC AGEN DETEC AMPLI PROBE: HCPCS | Mod: ORL | Performed by: FAMILY MEDICINE

## 2021-12-26 ENCOUNTER — LAB REQUISITION (OUTPATIENT)
Dept: LAB | Facility: CLINIC | Age: 61
End: 2021-12-26
Payer: COMMERCIAL

## 2021-12-27 NOTE — PROGRESS NOTES
Code Status:  FULL CODE  Visit Type: RECHECK     Facility:   [facility] ([facility]) [[id number]]        History of Present Illness:   Hospital Admission Date: 12/13/2021 Hospital Discharge Date: 12/21/2021      Dafne Cox is a 61 year old female with a past medical history for Alcohol disorder, frequent falls, lumbago, peripheral edema, HTN, lumbar spondylosis with stenosis.  She was recently hospitalized for failure to thrive at home, weakness 2/2 thiamine deficiency due to ETOH abuse and depression.  She was started on high dose thiamine and cognitive scores showed some improvement.  She was continued on thiamine, folic acid, MVI and acamprosate.  For her depression she was started on fluoxetine due to prolonged QTc upon admission 539.  She will need a follow up EKG to monitor her QTc.     Of note; incidental finding on CT of chest showed a 2.5cm splenic artery aneurysm.  IR recommended outpatient splenic aneurysm emobolization.  Will need to follow up with after discharge.     Today, upon exam patient is lying in bed and endorses increased fatigue and depression.  She does become weepy during conversation and perseverates on her feelings of guilt surrounding her alcohol use.  She reports therapy is going well denies any pain or discomforts.        Current Outpatient Medications   Medication Sig Dispense Refill     acamprosate (CAMPRAL) 333 MG EC tablet Take 2 tablets (666 mg) by mouth 3 times daily 180 tablet 1     acetaminophen (TYLENOL) 325 MG tablet Take 3 tablets (975 mg) by mouth every 8 hours as needed for mild pain 30 tablet 1     FLUoxetine (PROZAC) 10 MG capsule Take 1 capsule (10 mg) by mouth daily 30 capsule 1     folic acid (FOLVITE) 1 MG tablet Take 1 tablet (1 mg) by mouth daily 30 tablet 1     melatonin 3 MG tablet Take 1 tablet (3 mg) by mouth nightly as needed for sleep 30 tablet 0     menthol-zinc oxide (CALMOSEPTINE) 0.44-20.6 % OINT ointment Apply topically 4 times daily as needed for  "skin protection 100 g 0     multivitamin, therapeutic (THERA-VIT) TABS tablet Take 1 tablet by mouth daily 90 tablet 1     polyethylene glycol (MIRALAX) 17 GM/Dose powder Take 17 g by mouth daily 510 g 0     thiamine (B-1) 100 MG tablet Take 1 tablet (100 mg) by mouth daily 30 tablet 1     No Known Allergies  Immunization History   Administered Date(s) Administered     COVID-19,PF,Pfizer (12+ Yrs) 05/25/2021, 06/16/2021       Medications list and allergies in the facility chart have been reviewed.  Please see facility EMR for most up to date list.       Review of Systems   Patient denies fever, chills, headache, lightheadedness, dizziness, rhinorrhea, cough, congestion, shortness of breath, chest pain, palpitations, abdominal pain, n/v, diarrhea, constipation, change in appetite, change in sleep pattern, dysuria, frequency, burning or pain with urination.  Other than stated in HPI all other review of systems is negative.       Physical Exam   Vital signs:/87   Pulse 112   Temp 97.5  F (36.4  C)   Resp 18   Ht 1.702 m (5' 7\")   Wt 103.9 kg (229 lb)   SpO2 97%   BMI 35.87 kg/m     GENERAL APPEARANCE: Well developed, obese woman in no acute distress.  HEENT: normocephalic, atraumatic  sclerae anicteric, conjunctivae clear and moist, EOM intact  LUNGS: Lung sounds CTA, no adventitious sounds, respiratory effort normal.  CARD: RRR, S1, S2, without murmurs, gallops, rubs  BACK: No kyphosis of the thoracic spine.   ABD: Soft, nondistended and nontender with normal bowel sounds.   MSK: Muscle strength and tone were equal bilaterally. Moves all extremities easily and intentionally.   EXTREMITIES: No cyanosis, clubbing or edema.  NEURO: Alert and oriented x 3.Face is symmetric.  SKIN: Inspection of the skin reveals no rashes, ulcerations or petechiae.  PSYCH;  weepy      Labs:    Last Comprehensive Metabolic Panel:  Sodium   Date Value Ref Range Status   12/28/2021 137 136 - 145 mmol/L Final     Potassium   Date " Value Ref Range Status   12/28/2021 3.6 3.5 - 5.0 mmol/L Final     Chloride   Date Value Ref Range Status   12/28/2021 105 98 - 107 mmol/L Final     Carbon Dioxide (CO2)   Date Value Ref Range Status   12/28/2021 20 (L) 22 - 31 mmol/L Final     Anion Gap   Date Value Ref Range Status   12/28/2021 12 5 - 18 mmol/L Final     Glucose   Date Value Ref Range Status   12/28/2021 118 70 - 125 mg/dL Final     Urea Nitrogen   Date Value Ref Range Status   12/28/2021 7 (L) 8 - 22 mg/dL Final     Creatinine   Date Value Ref Range Status   12/28/2021 0.60 0.60 - 1.10 mg/dL Final     GFR Estimate   Date Value Ref Range Status   12/28/2021 >90 >60 mL/min/1.73m2 Final     Comment:     Effective December 21, 2021 eGFRcr in adults is calculated using the 2021 CKD-EPI creatinine equation which includes age and gender (Saulo et al., NEJ, DOI: 10.1056/NUMAht2505360)     Calcium   Date Value Ref Range Status   12/28/2021 9.7 8.5 - 10.5 mg/dL Final     Lab Results   Component Value Date    WBC 4.5 12/21/2021     Lab Results   Component Value Date    RBC 2.91 12/21/2021     Lab Results   Component Value Date    HGB 9.5 12/21/2021     Lab Results   Component Value Date    HCT 28.5 12/21/2021     Lab Results   Component Value Date    MCV 98 12/21/2021     Lab Results   Component Value Date    MCH 32.6 12/21/2021     Lab Results   Component Value Date    MCHC 33.3 12/21/2021     Lab Results   Component Value Date    RDW 15.3 12/21/2021     Lab Results   Component Value Date     12/21/2021           Assessment/plan:   Failure to thrive in adult  Waxes and wanes due to depression.  Does have a good appetite and is eating well.    Alcohol use disorder, moderate, dependence (H)  We will have ACP see her hopefully this week.  Again recommended the importance of finding a psychotherapist and psychiatrist as an outpatient.  Continue on acamprosate, B vitamins and multivitamin.      Depression, unspecified depression type  Mood fluctuations,  counseled patient on the fact that this is normal due to self treating her depression with alcohol in the past.  Continue on current dose of Prozac counseled on effects.  Continue on melatonin at at bedtime.  Needs psychiatry follow-up.      Morbid obesity:   Does increase her morbidity    Splenic artery aneurysm:  Counseled patient on need for follow-up.  She needs to establish a primary care provider at [place] and then follow-up with interventional radiology for possible embolization.  Reassured patient that if she was discharged without embolization the infarct was stable.    Lumbar back pain  Stable, follow up with Spine clinic.  Continue with apap    Severe protein-calorie malnutrition (H)  Supplements, dietician to eval    Prolonged QT interval  Will need follow up EKG with hospital       35 total minutes spent with 20 minutes spent counseling patient on mood and need for follow-up with psychiatry.    Electronically signed by: Katina Venegas NP

## 2021-12-28 ENCOUNTER — TELEPHONE (OUTPATIENT)
Dept: GERIATRICS | Facility: CLINIC | Age: 61
End: 2021-12-28

## 2021-12-28 ENCOUNTER — TRANSITIONAL CARE UNIT VISIT (OUTPATIENT)
Dept: GERIATRICS | Facility: CLINIC | Age: 61
End: 2021-12-28
Payer: COMMERCIAL

## 2021-12-28 VITALS
TEMPERATURE: 97.5 F | DIASTOLIC BLOOD PRESSURE: 87 MMHG | WEIGHT: 229 LBS | HEIGHT: 67 IN | RESPIRATION RATE: 18 BRPM | BODY MASS INDEX: 35.94 KG/M2 | SYSTOLIC BLOOD PRESSURE: 127 MMHG | OXYGEN SATURATION: 97 % | HEART RATE: 112 BPM

## 2021-12-28 DIAGNOSIS — I72.8 SPLENIC ARTERY ANEURYSM (H): ICD-10-CM

## 2021-12-28 DIAGNOSIS — E66.01 MORBID OBESITY (H): ICD-10-CM

## 2021-12-28 DIAGNOSIS — R62.7 FAILURE TO THRIVE IN ADULT: Primary | ICD-10-CM

## 2021-12-28 DIAGNOSIS — F32.A DEPRESSION, UNSPECIFIED DEPRESSION TYPE: ICD-10-CM

## 2021-12-28 DIAGNOSIS — F10.20 ALCOHOL USE DISORDER, MODERATE, DEPENDENCE (H): ICD-10-CM

## 2021-12-28 LAB
ALBUMIN SERPL-MCNC: 2.8 G/DL (ref 3.5–5)
ALP SERPL-CCNC: 91 U/L (ref 45–120)
ALT SERPL W P-5'-P-CCNC: 21 U/L (ref 0–45)
ANION GAP SERPL CALCULATED.3IONS-SCNC: 12 MMOL/L (ref 5–18)
AST SERPL W P-5'-P-CCNC: 27 U/L (ref 0–40)
BILIRUB SERPL-MCNC: 1.3 MG/DL (ref 0–1)
BUN SERPL-MCNC: 7 MG/DL (ref 8–22)
CALCIUM SERPL-MCNC: 9.7 MG/DL (ref 8.5–10.5)
CHLORIDE BLD-SCNC: 105 MMOL/L (ref 98–107)
CO2 SERPL-SCNC: 20 MMOL/L (ref 22–31)
CREAT SERPL-MCNC: 0.6 MG/DL (ref 0.6–1.1)
ERYTHROCYTE [DISTWIDTH] IN BLOOD BY AUTOMATED COUNT: 14.3 % (ref 10–15)
GFR SERPL CREATININE-BSD FRML MDRD: >90 ML/MIN/1.73M2
GLUCOSE BLD-MCNC: 118 MG/DL (ref 70–125)
HCT VFR BLD AUTO: 34 % (ref 35–47)
HGB BLD-MCNC: 11 G/DL (ref 11.7–15.7)
MCH RBC QN AUTO: 32.1 PG (ref 26.5–33)
MCHC RBC AUTO-ENTMCNC: 32.4 G/DL (ref 31.5–36.5)
MCV RBC AUTO: 99 FL (ref 78–100)
PLATELET # BLD AUTO: 278 10E3/UL (ref 150–450)
POTASSIUM BLD-SCNC: 3.6 MMOL/L (ref 3.5–5)
PROT SERPL-MCNC: 6.8 G/DL (ref 6–8)
RBC # BLD AUTO: 3.43 10E6/UL (ref 3.8–5.2)
SODIUM SERPL-SCNC: 137 MMOL/L (ref 136–145)
WBC # BLD AUTO: 6.6 10E3/UL (ref 4–11)

## 2021-12-28 PROCEDURE — P9604 ONE-WAY ALLOW PRORATED TRIP: HCPCS | Mod: ORL | Performed by: FAMILY MEDICINE

## 2021-12-28 PROCEDURE — 80053 COMPREHEN METABOLIC PANEL: CPT | Mod: ORL | Performed by: FAMILY MEDICINE

## 2021-12-28 PROCEDURE — 85027 COMPLETE CBC AUTOMATED: CPT | Mod: ORL | Performed by: FAMILY MEDICINE

## 2021-12-28 PROCEDURE — 99310 SBSQ NF CARE HIGH MDM 45: CPT | Performed by: NURSE PRACTITIONER

## 2021-12-28 PROCEDURE — 36415 COLL VENOUS BLD VENIPUNCTURE: CPT | Mod: ORL | Performed by: FAMILY MEDICINE

## 2021-12-28 ASSESSMENT — MIFFLIN-ST. JEOR: SCORE: 1646.37

## 2021-12-28 NOTE — LETTER
12/28/2021        RE: Autumn ANAIS Cox  [address]  [place] [address]        Code Status:  FULL CODE  Visit Type: RECHECK     Facility:   [facility] ([facility]) [[id number]]        History of Present Illness:   Hospital Admission Date: 12/13/2021 Hospital Discharge Date: 12/21/2021      Dafne Cox is a 61 year old female with a past medical history for Alcohol disorder, frequent falls, lumbago, peripheral edema, HTN, lumbar spondylosis with stenosis.  She was recently hospitalized for failure to thrive at home, weakness 2/2 thiamine deficiency due to ETOH abuse and depression.  She was started on high dose thiamine and cognitive scores showed some improvement.  She was continued on thiamine, folic acid, MVI and acamprosate.  For her depression she was started on fluoxetine due to prolonged QTc upon admission 539.  She will need a follow up EKG to monitor her QTc.     Of note; incidental finding on CT of chest showed a 2.5cm splenic artery aneurysm.  IR recommended outpatient splenic aneurysm emobolization.  Will need to follow up with after discharge.     Today, upon exam patient is lying in bed and endorses increased fatigue and depression.  She does become weepy during conversation and perseverates on her feelings of guilt surrounding her alcohol use.  She reports therapy is going well denies any pain or discomforts.        Current Outpatient Medications   Medication Sig Dispense Refill     acamprosate (CAMPRAL) 333 MG EC tablet Take 2 tablets (666 mg) by mouth 3 times daily 180 tablet 1     acetaminophen (TYLENOL) 325 MG tablet Take 3 tablets (975 mg) by mouth every 8 hours as needed for mild pain 30 tablet 1     FLUoxetine (PROZAC) 10 MG capsule Take 1 capsule (10 mg) by mouth daily 30 capsule 1     folic acid (FOLVITE) 1 MG tablet Take 1 tablet (1 mg) by mouth daily 30 tablet 1     melatonin 3 MG tablet Take 1 tablet (3 mg) by mouth nightly as needed for sleep 30 tablet 0      "menthol-zinc oxide (CALMOSEPTINE) 0.44-20.6 % OINT ointment Apply topically 4 times daily as needed for skin protection 100 g 0     multivitamin, therapeutic (THERA-VIT) TABS tablet Take 1 tablet by mouth daily 90 tablet 1     polyethylene glycol (MIRALAX) 17 GM/Dose powder Take 17 g by mouth daily 510 g 0     thiamine (B-1) 100 MG tablet Take 1 tablet (100 mg) by mouth daily 30 tablet 1     No Known Allergies  Immunization History   Administered Date(s) Administered     COVID-19,PF,Pfizer (12+ Yrs) 05/25/2021, 06/16/2021       Medications list and allergies in the facility chart have been reviewed.  Please see facility EMR for most up to date list.       Review of Systems   Patient denies fever, chills, headache, lightheadedness, dizziness, rhinorrhea, cough, congestion, shortness of breath, chest pain, palpitations, abdominal pain, n/v, diarrhea, constipation, change in appetite, change in sleep pattern, dysuria, frequency, burning or pain with urination.  Other than stated in HPI all other review of systems is negative.       Physical Exam   Vital signs:/87   Pulse 112   Temp 97.5  F (36.4  C)   Resp 18   Ht 1.702 m (5' 7\")   Wt 103.9 kg (229 lb)   SpO2 97%   BMI 35.87 kg/m     GENERAL APPEARANCE: Well developed, obese woman in no acute distress.  HEENT: normocephalic, atraumatic  sclerae anicteric, conjunctivae clear and moist, EOM intact  LUNGS: Lung sounds CTA, no adventitious sounds, respiratory effort normal.  CARD: RRR, S1, S2, without murmurs, gallops, rubs  BACK: No kyphosis of the thoracic spine.   ABD: Soft, nondistended and nontender with normal bowel sounds.   MSK: Muscle strength and tone were equal bilaterally. Moves all extremities easily and intentionally.   EXTREMITIES: No cyanosis, clubbing or edema.  NEURO: Alert and oriented x 3.Face is symmetric.  SKIN: Inspection of the skin reveals no rashes, ulcerations or petechiae.  PSYCH;  weepy      Labs:    Last Comprehensive Metabolic " Panel:  Sodium   Date Value Ref Range Status   12/28/2021 137 136 - 145 mmol/L Final     Potassium   Date Value Ref Range Status   12/28/2021 3.6 3.5 - 5.0 mmol/L Final     Chloride   Date Value Ref Range Status   12/28/2021 105 98 - 107 mmol/L Final     Carbon Dioxide (CO2)   Date Value Ref Range Status   12/28/2021 20 (L) 22 - 31 mmol/L Final     Anion Gap   Date Value Ref Range Status   12/28/2021 12 5 - 18 mmol/L Final     Glucose   Date Value Ref Range Status   12/28/2021 118 70 - 125 mg/dL Final     Urea Nitrogen   Date Value Ref Range Status   12/28/2021 7 (L) 8 - 22 mg/dL Final     Creatinine   Date Value Ref Range Status   12/28/2021 0.60 0.60 - 1.10 mg/dL Final     GFR Estimate   Date Value Ref Range Status   12/28/2021 >90 >60 mL/min/1.73m2 Final     Comment:     Effective December 21, 2021 eGFRcr in adults is calculated using the 2021 CKD-EPI creatinine equation which includes age and gender (Saulo et al., NEJ, DOI: 10.1056/EYMLkl3093483)     Calcium   Date Value Ref Range Status   12/28/2021 9.7 8.5 - 10.5 mg/dL Final     Lab Results   Component Value Date    WBC 4.5 12/21/2021     Lab Results   Component Value Date    RBC 2.91 12/21/2021     Lab Results   Component Value Date    HGB 9.5 12/21/2021     Lab Results   Component Value Date    HCT 28.5 12/21/2021     Lab Results   Component Value Date    MCV 98 12/21/2021     Lab Results   Component Value Date    MCH 32.6 12/21/2021     Lab Results   Component Value Date    MCHC 33.3 12/21/2021     Lab Results   Component Value Date    RDW 15.3 12/21/2021     Lab Results   Component Value Date     12/21/2021           Assessment/plan:   Failure to thrive in adult  Waxes and wanes due to depression.  Does have a good appetite and is eating well.    Alcohol use disorder, moderate, dependence (H)  We will have ACP see her hopefully this week.  Again recommended the importance of finding a psychotherapist and psychiatrist as an outpatient.  Continue on  acamprosate, B vitamins and multivitamin.      Depression, unspecified depression type  Mood fluctuations, counseled patient on the fact that this is normal due to self treating her depression with alcohol in the past.  Continue on current dose of Prozac counseled on effects.  Continue on melatonin at at bedtime.  Needs psychiatry follow-up.      Morbid obesity:   Does increase her morbidity    Splenic artery aneurysm:  Counseled patient on need for follow-up.  She needs to establish a primary care provider at [place] and then follow-up with interventional radiology for possible embolization.  Reassured patient that if she was discharged without embolization the infarct was stable.    Lumbar back pain  Stable, follow up with Spine clinic.  Continue with apap    Severe protein-calorie malnutrition (H)  Supplements, dietician to eval    Prolonged QT interval  Will need follow up EKG with hospital       35 total minutes spent with 20 minutes spent counseling patient on mood and need for follow-up with psychiatry.    Electronically signed by: Katina Venegas NP          Sincerely,        Katina Venegas NP

## 2021-12-28 NOTE — TELEPHONE ENCOUNTER
[facility] Nurse Triage Telephone Note    Provider: LOLA Guerrero  Facility: [facility] Facility Type:  TCU    Caller: Nasrin  Call Back Number: [phone]    Allergies:  No Known Allergies     Reason for call: Nurse was calling to report Heme 2 and CMP results.  Notable meds:  Folic acid 1mg daily, Prozac 10mg daily, Acamprosate 666mg TID.      Verbal Order/Direction given by Provider: No new orders.      Provider giving Order:  LOLA Guerrero    Verbal Order given to: Nasrin Hidalgo RN

## 2022-01-03 ENCOUNTER — LAB REQUISITION (OUTPATIENT)
Dept: LAB | Facility: CLINIC | Age: 62
End: 2022-01-03
Payer: COMMERCIAL

## 2022-01-03 DIAGNOSIS — E87.1 HYPO-OSMOLALITY AND HYPONATREMIA: ICD-10-CM

## 2022-01-03 DIAGNOSIS — R94.5 ABNORMAL RESULTS OF LIVER FUNCTION STUDIES: ICD-10-CM

## 2022-01-05 ENCOUNTER — TRANSITIONAL CARE UNIT VISIT (OUTPATIENT)
Dept: GERIATRICS | Facility: CLINIC | Age: 62
End: 2022-01-05
Payer: COMMERCIAL

## 2022-01-05 VITALS
HEIGHT: 67 IN | SYSTOLIC BLOOD PRESSURE: 123 MMHG | BODY MASS INDEX: 34.53 KG/M2 | WEIGHT: 220 LBS | HEART RATE: 96 BPM | RESPIRATION RATE: 18 BRPM | OXYGEN SATURATION: 96 % | TEMPERATURE: 97.9 F | DIASTOLIC BLOOD PRESSURE: 69 MMHG

## 2022-01-05 DIAGNOSIS — F32.A DEPRESSION, UNSPECIFIED DEPRESSION TYPE: ICD-10-CM

## 2022-01-05 DIAGNOSIS — I72.8 SPLENIC ARTERY ANEURYSM (H): ICD-10-CM

## 2022-01-05 DIAGNOSIS — R62.7 FAILURE TO THRIVE IN ADULT: Primary | ICD-10-CM

## 2022-01-05 DIAGNOSIS — F10.20 ALCOHOL USE DISORDER, MODERATE, DEPENDENCE (H): ICD-10-CM

## 2022-01-05 DIAGNOSIS — E66.01 MORBID OBESITY (H): ICD-10-CM

## 2022-01-05 LAB
ALBUMIN SERPL-MCNC: 2.6 G/DL (ref 3.5–5)
ALP SERPL-CCNC: 112 U/L (ref 45–120)
ALT SERPL W P-5'-P-CCNC: 19 U/L (ref 0–45)
AST SERPL W P-5'-P-CCNC: 24 U/L (ref 0–40)
BILIRUB DIRECT SERPL-MCNC: 0.2 MG/DL
BILIRUB SERPL-MCNC: 0.5 MG/DL (ref 0–1)
PROT SERPL-MCNC: 6.7 G/DL (ref 6–8)

## 2022-01-05 PROCEDURE — 80076 HEPATIC FUNCTION PANEL: CPT | Mod: ORL | Performed by: FAMILY MEDICINE

## 2022-01-05 PROCEDURE — 99305 1ST NF CARE MODERATE MDM 35: CPT | Performed by: FAMILY MEDICINE

## 2022-01-05 PROCEDURE — P9604 ONE-WAY ALLOW PRORATED TRIP: HCPCS | Mod: ORL | Performed by: FAMILY MEDICINE

## 2022-01-05 PROCEDURE — 36415 COLL VENOUS BLD VENIPUNCTURE: CPT | Mod: ORL | Performed by: FAMILY MEDICINE

## 2022-01-05 ASSESSMENT — MIFFLIN-ST. JEOR: SCORE: 1605.54

## 2022-01-05 NOTE — PROGRESS NOTES
[facility] GERIATRIC SERVICES       Patient Dafne D [facility]  MRN: [id number]  [facility]        Reason for Visit     Chief Complaint   Patient presents with     Hospital F/U       Code Status     CPR/Full code     Assessment     FTT  ETOH use disorder  wernicke's encephalopathy  H/o fall with head trauma  Splenic artery aneurysm  Prolonged QT  Lymphedema  Morbid obesity BMI >34  Generalized weakness    Plan     Pt is admitted to TCU for strengthening and rehab.  Patient was admitted in the hospital with failure to thrive generalized weakness prior history of a fall and cognitive changes.  She had improvement in her cognitive status after thiamine was replaced and it is felt to be secondary to alcohol use disorder.  She is on a acamprosate 660 mg 3 times daily.  Today she denies any alcohol craving.  She will continue with her vitamin supplements including thiamine folic acid and multivitamin.  Strongly encouraged not to resume drinking.  She has orders for LFTs recheck done today  Also will continue to monitor mood and behaviors she is not a candidate for aggressive treatment with with most medications because of prolonged QT concerns.  However she was started on duloxetine.  Nursing reports a lack of motivation and potentially bedbound status patient was encouraged by me to get out of bed today.  Appears to have chronic lymphedema which has improved significantly she has a chronic wound on her right lower extremity which is healing.  Advised that she will need Tubigrip stockings or compression and can follow-up with her primary care physician regarding that.  She does not think it comes from her disease also wound care management as well as using topical emollients was reviewed with her.  She does have splenic artery aneurysm and has been recommended to go for an outpatient procedure with possibly outpatient splenic artery embolization she will make that decision once she discharges from the hospital  Recheck labs-  r/c LFTS are stable  Low albumin consistent with malnutrition noted  Continue with PT/OT-so far wheelchair-bound with a balance score of 17/56 on her Reed.  Slums is 23/30    History     Patient is a very pleasant 61 year old female who is admitted to TCU  Patient is a 61-year-old who presented to the hospital with increasing weakness and difficulty with ambulation  She works at [employer] and was working from home because of the Covid pandemic.  She has become increasingly immobile and sedentary and gaining weight.  She was admitted in the hospital and was noted to have elevated LFTs secondary to chronic alcohol use.  She had some cognitive changes on admission which improved with thiamine replacement.  Overall it was felt that her clinical picture was consistent with thiamine deficiency secondary to alcohol use disorder  She also reports that she has become increasingly debilitated she had a fall couple of months prior to hospitalization where she fell backwards and hit her head.  Since then she has been having more back pain.  She also reports increasing depression with situational stressors including being housebound    Past Medical & Surgical History     Depression  History of fall with head trauma.  Splenic artery aneurysm  Lymphedema    Past Social History     Reviewed,  reports that she has never smoked. She has never used smokeless tobacco. She reports current alcohol use.    Family History     Reviewed, and family history includes Cancer in her father and mother.    Medication List   Post Discharge Medication Reconciliation Status: Post Discharge Medication Reconciliation Status: discharge medications reconciled and changed, per note/orders.  Current Outpatient Medications   Medication     acamprosate (CAMPRAL) 333 MG EC tablet     acetaminophen (TYLENOL) 325 MG tablet     FLUoxetine (PROZAC) 10 MG capsule     folic acid (FOLVITE) 1 MG tablet     melatonin 3 MG tablet     menthol-zinc oxide (CALMOSEPTINE)  "0.44-20.6 % OINT ointment     multivitamin, therapeutic (THERA-VIT) TABS tablet     polyethylene glycol (MIRALAX) 17 GM/Dose powder     thiamine (B-1) 100 MG tablet     No current facility-administered medications for this visit.       Allergies     No Known Allergies    Review of Systems   A comprehensive review of 14 systems was done. Pertinent findings noted here and in history of present illness. All the rest negative.  Constitutional: Negative.  Negative for fever, chills, she has  activity change, appetite change and fatigue.   HENT: Negative for congestion and facial swelling.    Eyes: Negative for photophobia, redness and visual disturbance.   Respiratory: Negative for cough and chest tightness.    Cardiovascular: Negative for chest pain, palpitations and has chronic leg swelling.   Gastrointestinal: Negative for nausea, diarrhea, constipation, blood in stool and abdominal distention.   Genitourinary: Negative.  Incontinence noted  Musculoskeletal: Reports ongoing issues with back pain and ambulation since a fall with head injury  Skin: Negative.    Neurological: Negative for dizziness, tremors, syncope, weakness, light-headedness and headaches.   Hematological: Does not bruise/bleed easily.   Psychiatric/Behavioral: Negative.  Mood is impaired; patient is poorly motivated and remains bedbound  She is labile emotionally and depression noted per pt      Physical Exam   /69   Pulse 96   Temp 97.9  F (36.6  C)   Resp 18   Ht 1.702 m (5' 7\")   Wt 99.8 kg (220 lb)   SpO2 96%   BMI 34.46 kg/m       Constitutional: Oriented to person, place, and time and appears well-developed.   Patient is obese  HEENT:  Normocephalic and atraumatic.  Eyes: Conjunctivae and EOM are normal. Pupils are equal, round, and reactive to light. No discharge.  No scleral icterus. Nose normal. Mouth/Throat: Oropharynx is clear and moist. No oropharyngeal exudate.    NECK: Normal range of motion. Neck supple. No JVD present. No " tracheal deviation present. No thyromegaly present.   CARDIOVASCULAR: Normal rate, regular rhythm and intact distal pulses.  Exam reveals no gallop and no friction rub.  Systolic murmur present.  PULMONARY: Effort normal and breath sounds normal. No respiratory distress.No Wheezing or rales.  ABDOMEN: Soft. Bowel sounds are normal. No distension and no mass.  There is no tenderness. There is no rebound and no guarding. No HSM.  MUSCULOSKELETAL: Normal range of motion.  1+ lower extremity edema and no tenderness. Mild kyphosis, no tenderness.  LYMPH NODES: Has no cervical, supraclavicular, axillary and groin adenopathy.   NEUROLOGICAL: Alert and oriented to person, place, and time. No cranial nerve deficit.  Normal muscle tone. Coordination normal.   GENITOURINARY: Deferred exam.  SKIN: Skin is warm and dry. No rash noted. No erythema. No pallor.  Stasis dermatitis with hyperpigmentation noted in both legs.  A chronic wound with scabbing noted on her right shin  EXTREMITIES: No cyanosis, no clubbing, 1+ lower extremity edema. No Deformity.  PSYCHIATRIC: Normal mood, affect and behavior.      Lab Results     Recent Results (from the past 240 hour(s))   Comprehensive metabolic panel    Collection Time: 12/28/21  8:33 AM   Result Value Ref Range    Sodium 137 136 - 145 mmol/L    Potassium 3.6 3.5 - 5.0 mmol/L    Chloride 105 98 - 107 mmol/L    Carbon Dioxide (CO2) 20 (L) 22 - 31 mmol/L    Anion Gap 12 5 - 18 mmol/L    Urea Nitrogen 7 (L) 8 - 22 mg/dL    Creatinine 0.60 0.60 - 1.10 mg/dL    Calcium 9.7 8.5 - 10.5 mg/dL    Glucose 118 70 - 125 mg/dL    Alkaline Phosphatase 91 45 - 120 U/L    AST 27 0 - 40 U/L    ALT 21 0 - 45 U/L    Protein Total 6.8 6.0 - 8.0 g/dL    Albumin 2.8 (L) 3.5 - 5.0 g/dL    Bilirubin Total 1.3 (H) 0.0 - 1.0 mg/dL    GFR Estimate >90 >60 mL/min/1.73m2   CBC with platelets    Collection Time: 12/28/21  8:33 AM   Result Value Ref Range    WBC Count 6.6 4.0 - 11.0 10e3/uL    RBC Count 3.43 (L) 3.80  - 5.20 10e6/uL    Hemoglobin 11.0 (L) 11.7 - 15.7 g/dL    Hematocrit 34.0 (L) 35.0 - 47.0 %    MCV 99 78 - 100 fL    MCH 32.1 26.5 - 33.0 pg    MCHC 32.4 31.5 - 36.5 g/dL    RDW 14.3 10.0 - 15.0 %    Platelet Count 278 150 - 450 10e3/uL   Hepatic function panel    Collection Time: 01/05/22  7:04 AM   Result Value Ref Range    Bilirubin Total 0.5 0.0 - 1.0 mg/dL    Bilirubin Direct 0.2 <=0.5 mg/dL    Protein Total 6.7 6.0 - 8.0 g/dL    Albumin 2.6 (L) 3.5 - 5.0 g/dL    Alkaline Phosphatase 112 45 - 120 U/L    AST 24 0 - 40 U/L    ALT 19 0 - 45 U/L             Imaging Results     US Lower Extremity Venous Duplex Bilateral    Result Date: 12/13/2021  EXAM: US LOWER EXTREMITY VENOUS DUPLEX BILATERAL LOCATION: [facility] DATE/TIME: 12/13/2021 5:20 PM INDICATION: Elevated D-Dimer, swelling COMPARISON: None. TECHNIQUE: Venous Duplex ultrasound of bilateral lower extremities with and without compression, augmentation and duplex. Color flow and spectral Doppler with waveform analysis performed. FINDINGS: Exam includes the common femoral, femoral, popliteal veins as well as segmentally visualized deep calf veins and greater saphenous vein. RIGHT: No deep vein thrombosis. No superficial thrombophlebitis. No popliteal cyst. LEFT: No deep vein thrombosis. No superficial thrombophlebitis. No popliteal cyst.     IMPRESSION: 1.  No deep venous thrombosis in the bilateral lower extremities.    XR Chest Port 1 View    Result Date: 12/13/2021  EXAM: XR CHEST PORT 1 VIEW LOCATION: [facility] DATE/TIME: 12/13/2021 3:34 PM INDICATION: Shortness of breath COMPARISON: 11/16/2021     IMPRESSION: Lungs are clear. Heart and pulmonary vascularity are normal. No signs of acute disease.    MR Brain w/o & w Contrast    Result Date: 12/15/2021  EXAM: MR BRAIN W/O and W CONTRAST LOCATION: [facility] DATE/TIME: 12/15/2021 3:52 PM INDICATION: Memory Loss COMPARISON: Head CT  dated 11/16/2021 CONTRAST: gadavist 10 ml TECHNIQUE: Multiplanar multisequence head MRI without and with intravenous contrast according to the dementia protocol. FINDINGS: INTRACRANIAL CONTENTS: No acute or subacute infarct. No mass, acute hemorrhage, or extra-axial fluid collections. Scattered nonspecific T2/FLAIR hyperintensities within the cerebral white matter most consistent with mild chronic microvascular ischemic change. Mild generalized cerebral atrophy. No hydrocephalus. No disproportionate regional volume loss. Normal position of the cerebellar tonsils. No pathologic contrast enhancement. SELLA: No abnormality accounting for technique. OSSEOUS STRUCTURES/SOFT TISSUES: Normal marrow signal. The major intracranial vascular flow voids are maintained. ORBITS: No abnormality accounting for technique. SINUSES/MASTOIDS: No paranasal sinus mucosal disease. No middle ear or mastoid effusion.     IMPRESSION: 1.  Brain atrophy and presumed chronic microvascular ischemic changes as detailed above. No dementia specific pattern identified. 2.  No acute intracranial process.    MR Cervical Spine w/o & w Contrast    Result Date: 12/14/2021  EXAM: MR CERVICAL SPINE W/O and W CONTRAST LOCATION: [facility] DATE/TIME: 12/14/2021 12:09 PM INDICATION: Spinal stenosis, C-spine, pain, recent falls. COMPARISON: None. CONTRAST: 10mL Gadavist TECHNIQUE: MRI Cervical Spine without and with IV contrast. FINDINGS: Normal vertebral body heights, alignment and marrow signal. No abnormal cord signal. No extraspinal abnormality. Craniovertebral junction and C1-C2: Normal. C2-C3: Normal disc height. No herniation. Normal facets. No spinal canal or neural foraminal stenosis. C3-C4: Normal disc height. No herniation. Normal facets. No spinal canal or neural foraminal stenosis. C4-C5: Normal disc height. No herniation. Normal facets. No spinal canal or neural foraminal stenosis. C5-C6: Tiny posterior central disc  herniation. Normal facets. No spinal canal or neural foraminal stenosis. C6-C7: Normal disc height. No herniation. Normal facets. No spinal canal or neural foraminal stenosis. C7-T1: Normal disc height. No herniation. Normal facets. No spinal canal or neural foraminal stenosis.     IMPRESSION: 1.  Tiny posterior central disc herniation arising from the C5-C6 intervertebral disc. 2.  Otherwise, normal cervical spine MRI. No spinal canal or neural foraminal stenosis at any level of the cervical spine.     MR Lumbar Spine w/o & w Contrast    Result Date: 12/14/2021  EXAM: MR LUMBAR SPINE W/O and W CONTRAST LOCATION: [facility] DATE/TIME: 12/14/2021 12:09 PM INDICATION: Low back pain, progressive neurologic deficit COMPARISON: X-ray 11/16/2021 CONTRAST: 10 mL Gadavist TECHNIQUE: Routine Lumbar Spine MRI without and with IV contrast. FINDINGS: Transitional lumbosacral anatomy. Nomenclature will remain concordant with that utilized on previous radiographs and assumes 4 nonrib-bearing lumbar type segments with a transitional L5 that is partially sacralized on the right. Preserved vertebral body heights. No significant subluxation. Diffusely heterogeneous marrow signal with patchy multifocal fatty marrow signal change, but without evidence for aggressive marrow replacement. Normal distal spinal cord and cauda equina with conus medullaris at L1. Mild posterior paraspinal muscular atrophy. Unremarkable visualized bony pelvis. T12-L1: Normal disc height and signal. No herniation. Normal facets. No spinal canal or neural foraminal stenosis. L1-L2: Normal disc height and signal. No herniation. Normal facets. No spinal canal or neural foraminal stenosis. L2-L3: Normal disc height and signal. No herniation. Minor facet arthropathy. No spinal canal or neural foraminal stenosis. L3-L4: Preserved disc height and signal. Slight annular bulge without focal disc herniation. Moderate right and mild left facet  arthropathy with mild ligamentum flavum thickening. No spinal canal or neural foraminal stenosis. L4-L5: Mild loss of disc at and signal. Circumferential disc osteophyte complex. Mild bilateral facet arthropathy. Mild narrowing of the lateral recesses without central spinal canal stenosis. Mild bilateral neural foraminal stenosis, greater on the right. L5-S1: Preserved disc height and signal. Slight annular bulge without focal disc herniation. Mild bilateral facet arthropathy. No spinal canal or neural foraminal stenosis.     IMPRESSION: 1.  Mild lumbar spondylosis without high-grade spinal canal stenosis. 2.  At L4-L5, mild disc degeneration with low-grade narrowing of the lateral recesses and mild bilateral neural foraminal stenosis.    CT Chest Pulmonary Embolism w Contrast    Result Date: 12/13/2021  EXAM: CT CHEST PULMONARY EMBOLISM W CONTRAST LOCATION: [facility] DATE/TIME: 12/13/2021 5:20 PM INDICATION: PE suspected, low/intermediate prob, positive D-dimer COMPARISON: None. TECHNIQUE: CT chest pulmonary angiogram during arterial phase injection of IV contrast. Multiplanar reformats and MIP reconstructions were performed. Dose reduction techniques were used. CONTRAST: isovue 370 100ml FINDINGS: ANGIOGRAM CHEST: Pulmonary arteries are normal caliber and negative for pulmonary emboli. Thoracic aorta is negative for dissection. No CT evidence of right heart strain. LUNGS AND PLEURA: Trace dependent atelectasis. No pleural effusion. No suspicious nodule. MEDIASTINUM/AXILLAE: Normal. CORONARY ARTERY CALCIFICATION: None. UPPER ABDOMEN: Hepatic steatosis. A 2.5 x 2.0 cm splenic artery aneurysm, image 131:5. No evidence of impending rupture. MUSCULOSKELETAL: Mild degenerative changes of the spine.     IMPRESSION: 1.  No pulmonary embolus. 2.  A 2.5 cm splenic artery aneurysm. Recommend interventional radiology or vascular surgery consultation.          Electronically signed by    Cris Jacobo  MD

## 2022-01-05 NOTE — LETTER
1/5/2022        RE: [facility]  [address]  [place] [address]        [facility] GERIATRIC SERVICES       Patient Dafne D Kenny  MRN: [id number]  [facility]        Reason for Visit     Chief Complaint   Patient presents with     Hospital F/U       Code Status     CPR/Full code     Assessment     FTT  ETOH use disorder  wernicke's encephalopathy  H/o fall with head trauma  Splenic artery aneurysm  Prolonged QT  Lymphedema  Morbid obesity BMI >34  Generalized weakness    Plan     Pt is admitted to TCU for strengthening and rehab.  Patient was admitted in the hospital with failure to thrive generalized weakness prior history of a fall and cognitive changes.  She had improvement in her cognitive status after thiamine was replaced and it is felt to be secondary to alcohol use disorder.  She is on a acamprosate 660 mg 3 times daily.  Today she denies any alcohol craving.  She will continue with her vitamin supplements including thiamine folic acid and multivitamin.  Strongly encouraged not to resume drinking.  She has orders for LFTs recheck done today  Also will continue to monitor mood and behaviors she is not a candidate for aggressive treatment with with most medications because of prolonged QT concerns.  However she was started on duloxetine.  Nursing reports a lack of motivation and potentially bedbound status patient was encouraged by me to get out of bed today.  Appears to have chronic lymphedema which has improved significantly she has a chronic wound on her right lower extremity which is healing.  Advised that she will need Tubigrip stockings or compression and can follow-up with her primary care physician regarding that.  She does not think it comes from her disease also wound care management as well as using topical emollients was reviewed with her.  She does have splenic artery aneurysm and has been recommended to go for an outpatient procedure with possibly outpatient splenic artery  embolization she will make that decision once she discharges from the hospital  Recheck labs- r/c LFTS are stable  Low albumin consistent with malnutrition noted  Continue with PT/OT-so far wheelchair-bound with a balance score of 17/56 on her Reed.  Slums is 23/30    History     Patient is a very pleasant 61 year old female who is admitted to TCU  Patient is a 61-year-old who presented to the hospital with increasing weakness and difficulty with ambulation  She works at [employer] and was working from home because of the Covid pandemic.  She has become increasingly immobile and sedentary and gaining weight.  She was admitted in the hospital and was noted to have elevated LFTs secondary to chronic alcohol use.  She had some cognitive changes on admission which improved with thiamine replacement.  Overall it was felt that her clinical picture was consistent with thiamine deficiency secondary to alcohol use disorder  She also reports that she has become increasingly debilitated she had a fall couple of months prior to hospitalization where she fell backwards and hit her head.  Since then she has been having more back pain.  She also reports increasing depression with situational stressors including being housebound    Past Medical & Surgical History     Depression  History of fall with head trauma.  Splenic artery aneurysm  Lymphedema    Past Social History     Reviewed,  reports that she has never smoked. She has never used smokeless tobacco. She reports current alcohol use.    Family History     Reviewed, and family history includes Cancer in her father and mother.    Medication List   Post Discharge Medication Reconciliation Status: Post Discharge Medication Reconciliation Status: discharge medications reconciled and changed, per note/orders.  Current Outpatient Medications   Medication     acamprosate (CAMPRAL) 333 MG EC tablet     acetaminophen (TYLENOL) 325 MG tablet     FLUoxetine (PROZAC) 10 MG capsule     folic acid  "(FOLVITE) 1 MG tablet     melatonin 3 MG tablet     menthol-zinc oxide (CALMOSEPTINE) 0.44-20.6 % OINT ointment     multivitamin, therapeutic (THERA-VIT) TABS tablet     polyethylene glycol (MIRALAX) 17 GM/Dose powder     thiamine (B-1) 100 MG tablet     No current facility-administered medications for this visit.       Allergies     No Known Allergies    Review of Systems   A comprehensive review of 14 systems was done. Pertinent findings noted here and in history of present illness. All the rest negative.  Constitutional: Negative.  Negative for fever, chills, she has  activity change, appetite change and fatigue.   HENT: Negative for congestion and facial swelling.    Eyes: Negative for photophobia, redness and visual disturbance.   Respiratory: Negative for cough and chest tightness.    Cardiovascular: Negative for chest pain, palpitations and has chronic leg swelling.   Gastrointestinal: Negative for nausea, diarrhea, constipation, blood in stool and abdominal distention.   Genitourinary: Negative.  Incontinence noted  Musculoskeletal: Reports ongoing issues with back pain and ambulation since a fall with head injury  Skin: Negative.    Neurological: Negative for dizziness, tremors, syncope, weakness, light-headedness and headaches.   Hematological: Does not bruise/bleed easily.   Psychiatric/Behavioral: Negative.  Mood is impaired; patient is poorly motivated and remains bedbound  She is labile emotionally and depression noted per pt      Physical Exam   /69   Pulse 96   Temp 97.9  F (36.6  C)   Resp 18   Ht 1.702 m (5' 7\")   Wt 99.8 kg (220 lb)   SpO2 96%   BMI 34.46 kg/m       Constitutional: Oriented to person, place, and time and appears well-developed.   Patient is obese  HEENT:  Normocephalic and atraumatic.  Eyes: Conjunctivae and EOM are normal. Pupils are equal, round, and reactive to light. No discharge.  No scleral icterus. Nose normal. Mouth/Throat: Oropharynx is clear and moist. No " oropharyngeal exudate.    NECK: Normal range of motion. Neck supple. No JVD present. No tracheal deviation present. No thyromegaly present.   CARDIOVASCULAR: Normal rate, regular rhythm and intact distal pulses.  Exam reveals no gallop and no friction rub.  Systolic murmur present.  PULMONARY: Effort normal and breath sounds normal. No respiratory distress.No Wheezing or rales.  ABDOMEN: Soft. Bowel sounds are normal. No distension and no mass.  There is no tenderness. There is no rebound and no guarding. No HSM.  MUSCULOSKELETAL: Normal range of motion.  1+ lower extremity edema and no tenderness. Mild kyphosis, no tenderness.  LYMPH NODES: Has no cervical, supraclavicular, axillary and groin adenopathy.   NEUROLOGICAL: Alert and oriented to person, place, and time. No cranial nerve deficit.  Normal muscle tone. Coordination normal.   GENITOURINARY: Deferred exam.  SKIN: Skin is warm and dry. No rash noted. No erythema. No pallor.  Stasis dermatitis with hyperpigmentation noted in both legs.  A chronic wound with scabbing noted on her right shin  EXTREMITIES: No cyanosis, no clubbing, 1+ lower extremity edema. No Deformity.  PSYCHIATRIC: Normal mood, affect and behavior.      Lab Results     Recent Results (from the past 240 hour(s))   Comprehensive metabolic panel    Collection Time: 12/28/21  8:33 AM   Result Value Ref Range    Sodium 137 136 - 145 mmol/L    Potassium 3.6 3.5 - 5.0 mmol/L    Chloride 105 98 - 107 mmol/L    Carbon Dioxide (CO2) 20 (L) 22 - 31 mmol/L    Anion Gap 12 5 - 18 mmol/L    Urea Nitrogen 7 (L) 8 - 22 mg/dL    Creatinine 0.60 0.60 - 1.10 mg/dL    Calcium 9.7 8.5 - 10.5 mg/dL    Glucose 118 70 - 125 mg/dL    Alkaline Phosphatase 91 45 - 120 U/L    AST 27 0 - 40 U/L    ALT 21 0 - 45 U/L    Protein Total 6.8 6.0 - 8.0 g/dL    Albumin 2.8 (L) 3.5 - 5.0 g/dL    Bilirubin Total 1.3 (H) 0.0 - 1.0 mg/dL    GFR Estimate >90 >60 mL/min/1.73m2   CBC with platelets    Collection Time: 12/28/21  8:33 AM    Result Value Ref Range    WBC Count 6.6 4.0 - 11.0 10e3/uL    RBC Count 3.43 (L) 3.80 - 5.20 10e6/uL    Hemoglobin 11.0 (L) 11.7 - 15.7 g/dL    Hematocrit 34.0 (L) 35.0 - 47.0 %    MCV 99 78 - 100 fL    MCH 32.1 26.5 - 33.0 pg    MCHC 32.4 31.5 - 36.5 g/dL    RDW 14.3 10.0 - 15.0 %    Platelet Count 278 150 - 450 10e3/uL   Hepatic function panel    Collection Time: 01/05/22  7:04 AM   Result Value Ref Range    Bilirubin Total 0.5 0.0 - 1.0 mg/dL    Bilirubin Direct 0.2 <=0.5 mg/dL    Protein Total 6.7 6.0 - 8.0 g/dL    Albumin 2.6 (L) 3.5 - 5.0 g/dL    Alkaline Phosphatase 112 45 - 120 U/L    AST 24 0 - 40 U/L    ALT 19 0 - 45 U/L             Imaging Results     US Lower Extremity Venous Duplex Bilateral    Result Date: 12/13/2021  EXAM: US LOWER EXTREMITY VENOUS DUPLEX BILATERAL LOCATION: [facility] DATE/TIME: 12/13/2021 5:20 PM INDICATION: Elevated D-Dimer, swelling COMPARISON: None. TECHNIQUE: Venous Duplex ultrasound of bilateral lower extremities with and without compression, augmentation and duplex. Color flow and spectral Doppler with waveform analysis performed. FINDINGS: Exam includes the common femoral, femoral, popliteal veins as well as segmentally visualized deep calf veins and greater saphenous vein. RIGHT: No deep vein thrombosis. No superficial thrombophlebitis. No popliteal cyst. LEFT: No deep vein thrombosis. No superficial thrombophlebitis. No popliteal cyst.     IMPRESSION: 1.  No deep venous thrombosis in the bilateral lower extremities.    XR Chest Port 1 View    Result Date: 12/13/2021  EXAM: XR CHEST PORT 1 VIEW LOCATION: [facility] DATE/TIME: 12/13/2021 3:34 PM INDICATION: Shortness of breath COMPARISON: 11/16/2021     IMPRESSION: Lungs are clear. Heart and pulmonary vascularity are normal. No signs of acute disease.    MR Brain w/o & w Contrast    Result Date: 12/15/2021  EXAM: MR BRAIN W/O and W CONTRAST LOCATION: [facility]  [facility] DATE/TIME: 12/15/2021 3:52 PM INDICATION: Memory Loss COMPARISON: Head CT dated 11/16/2021 CONTRAST: gadavist 10 ml TECHNIQUE: Multiplanar multisequence head MRI without and with intravenous contrast according to the dementia protocol. FINDINGS: INTRACRANIAL CONTENTS: No acute or subacute infarct. No mass, acute hemorrhage, or extra-axial fluid collections. Scattered nonspecific T2/FLAIR hyperintensities within the cerebral white matter most consistent with mild chronic microvascular ischemic change. Mild generalized cerebral atrophy. No hydrocephalus. No disproportionate regional volume loss. Normal position of the cerebellar tonsils. No pathologic contrast enhancement. SELLA: No abnormality accounting for technique. OSSEOUS STRUCTURES/SOFT TISSUES: Normal marrow signal. The major intracranial vascular flow voids are maintained. ORBITS: No abnormality accounting for technique. SINUSES/MASTOIDS: No paranasal sinus mucosal disease. No middle ear or mastoid effusion.     IMPRESSION: 1.  Brain atrophy and presumed chronic microvascular ischemic changes as detailed above. No dementia specific pattern identified. 2.  No acute intracranial process.    MR Cervical Spine w/o & w Contrast    Result Date: 12/14/2021  EXAM: MR CERVICAL SPINE W/O and W CONTRAST LOCATION: [facility] DATE/TIME: 12/14/2021 12:09 PM INDICATION: Spinal stenosis, C-spine, pain, recent falls. COMPARISON: None. CONTRAST: 10mL Gadavist TECHNIQUE: MRI Cervical Spine without and with IV contrast. FINDINGS: Normal vertebral body heights, alignment and marrow signal. No abnormal cord signal. No extraspinal abnormality. Craniovertebral junction and C1-C2: Normal. C2-C3: Normal disc height. No herniation. Normal facets. No spinal canal or neural foraminal stenosis. C3-C4: Normal disc height. No herniation. Normal facets. No spinal canal or neural foraminal stenosis. C4-C5: Normal disc height. No herniation. Normal  facets. No spinal canal or neural foraminal stenosis. C5-C6: Tiny posterior central disc herniation. Normal facets. No spinal canal or neural foraminal stenosis. C6-C7: Normal disc height. No herniation. Normal facets. No spinal canal or neural foraminal stenosis. C7-T1: Normal disc height. No herniation. Normal facets. No spinal canal or neural foraminal stenosis.     IMPRESSION: 1.  Tiny posterior central disc herniation arising from the C5-C6 intervertebral disc. 2.  Otherwise, normal cervical spine MRI. No spinal canal or neural foraminal stenosis at any level of the cervical spine.     MR Lumbar Spine w/o & w Contrast    Result Date: 12/14/2021  EXAM: MR LUMBAR SPINE W/O and W CONTRAST LOCATION: [facility] DATE/TIME: 12/14/2021 12:09 PM INDICATION: Low back pain, progressive neurologic deficit COMPARISON: X-ray 11/16/2021 CONTRAST: 10 mL Gadavist TECHNIQUE: Routine Lumbar Spine MRI without and with IV contrast. FINDINGS: Transitional lumbosacral anatomy. Nomenclature will remain concordant with that utilized on previous radiographs and assumes 4 nonrib-bearing lumbar type segments with a transitional L5 that is partially sacralized on the right. Preserved vertebral body heights. No significant subluxation. Diffusely heterogeneous marrow signal with patchy multifocal fatty marrow signal change, but without evidence for aggressive marrow replacement. Normal distal spinal cord and cauda equina with conus medullaris at L1. Mild posterior paraspinal muscular atrophy. Unremarkable visualized bony pelvis. T12-L1: Normal disc height and signal. No herniation. Normal facets. No spinal canal or neural foraminal stenosis. L1-L2: Normal disc height and signal. No herniation. Normal facets. No spinal canal or neural foraminal stenosis. L2-L3: Normal disc height and signal. No herniation. Minor facet arthropathy. No spinal canal or neural foraminal stenosis. L3-L4: Preserved disc height and signal.  Slight annular bulge without focal disc herniation. Moderate right and mild left facet arthropathy with mild ligamentum flavum thickening. No spinal canal or neural foraminal stenosis. L4-L5: Mild loss of disc at and signal. Circumferential disc osteophyte complex. Mild bilateral facet arthropathy. Mild narrowing of the lateral recesses without central spinal canal stenosis. Mild bilateral neural foraminal stenosis, greater on the right. L5-S1: Preserved disc height and signal. Slight annular bulge without focal disc herniation. Mild bilateral facet arthropathy. No spinal canal or neural foraminal stenosis.     IMPRESSION: 1.  Mild lumbar spondylosis without high-grade spinal canal stenosis. 2.  At L4-L5, mild disc degeneration with low-grade narrowing of the lateral recesses and mild bilateral neural foraminal stenosis.    CT Chest Pulmonary Embolism w Contrast    Result Date: 12/13/2021  EXAM: CT CHEST PULMONARY EMBOLISM W CONTRAST LOCATION: [facility] DATE/TIME: 12/13/2021 5:20 PM INDICATION: PE suspected, low/intermediate prob, positive D-dimer COMPARISON: None. TECHNIQUE: CT chest pulmonary angiogram during arterial phase injection of IV contrast. Multiplanar reformats and MIP reconstructions were performed. Dose reduction techniques were used. CONTRAST: isovue 370 100ml FINDINGS: ANGIOGRAM CHEST: Pulmonary arteries are normal caliber and negative for pulmonary emboli. Thoracic aorta is negative for dissection. No CT evidence of right heart strain. LUNGS AND PLEURA: Trace dependent atelectasis. No pleural effusion. No suspicious nodule. MEDIASTINUM/AXILLAE: Normal. CORONARY ARTERY CALCIFICATION: None. UPPER ABDOMEN: Hepatic steatosis. A 2.5 x 2.0 cm splenic artery aneurysm, image 131:5. No evidence of impending rupture. MUSCULOSKELETAL: Mild degenerative changes of the spine.     IMPRESSION: 1.  No pulmonary embolus. 2.  A 2.5 cm splenic artery aneurysm. Recommend interventional  radiology or vascular surgery consultation.          Electronically signed by    Cris Jacobo MD                             Sincerely,        ABBEY Sena

## 2022-01-20 ENCOUNTER — TRANSITIONAL CARE UNIT VISIT (OUTPATIENT)
Dept: GERIATRICS | Facility: CLINIC | Age: 62
End: 2022-01-20
Payer: COMMERCIAL

## 2022-01-20 VITALS
DIASTOLIC BLOOD PRESSURE: 65 MMHG | TEMPERATURE: 97.5 F | SYSTOLIC BLOOD PRESSURE: 119 MMHG | BODY MASS INDEX: 35.16 KG/M2 | RESPIRATION RATE: 16 BRPM | WEIGHT: 224 LBS | OXYGEN SATURATION: 98 % | HEART RATE: 74 BPM | HEIGHT: 67 IN

## 2022-01-20 DIAGNOSIS — M62.81 GENERALIZED MUSCLE WEAKNESS: ICD-10-CM

## 2022-01-20 DIAGNOSIS — R62.7 FAILURE TO THRIVE IN ADULT: ICD-10-CM

## 2022-01-20 DIAGNOSIS — F10.20 ALCOHOL USE DISORDER, MODERATE, DEPENDENCE (H): Primary | ICD-10-CM

## 2022-01-20 DIAGNOSIS — F32.A DEPRESSION, UNSPECIFIED DEPRESSION TYPE: ICD-10-CM

## 2022-01-20 PROCEDURE — 99309 SBSQ NF CARE MODERATE MDM 30: CPT | Performed by: NURSE PRACTITIONER

## 2022-01-20 ASSESSMENT — MIFFLIN-ST. JEOR: SCORE: 1618.69

## 2022-01-20 NOTE — LETTER
1/20/2022        RE: Dafne Cox  [address]  [place] [address]        Code Status:  FULL CODE  Visit Type: RECHECK     Facility:   [facility] ([facility]) [[id number]]        History of Present Illness:   Hospital Admission Date: 12/13/2021 Hospital Discharge Date: 12/21/2021      Dafne Cox is a 61 year old female with a past medical history for Alcohol disorder, frequent falls, lumbago, peripheral edema, HTN, lumbar spondylosis with stenosis.  She was recently hospitalized for failure to thrive at home, weakness 2/2 thiamine deficiency due to ETOH abuse and depression.  She was started on high dose thiamine and cognitive scores showed some improvement.  She was continued on thiamine, folic acid, MVI and acamprosate.  For her depression she was started on fluoxetine due to prolonged QTc upon admission 539.  She will need a follow up EKG to monitor her QTc.     Of note; incidental finding on CT of chest showed a 2.5cm splenic artery aneurysm.  IR recommended outpatient splenic aneurysm emobolization.  Will need to follow up with after discharge.     Today, she reports feeling generally well.  She is improving with therapy to ambulating with supervision.  She is planning to complete therapy on 2/1 and will discharge to home.  She endorses a good mood.         Current Outpatient Medications   Medication Sig Dispense Refill     acamprosate (CAMPRAL) 333 MG EC tablet Take 2 tablets (666 mg) by mouth 3 times daily 180 tablet 1     acetaminophen (TYLENOL) 325 MG tablet Take 3 tablets (975 mg) by mouth every 8 hours as needed for mild pain 30 tablet 1     FLUoxetine (PROZAC) 10 MG capsule Take 1 capsule (10 mg) by mouth daily 30 capsule 1     folic acid (FOLVITE) 1 MG tablet Take 1 tablet (1 mg) by mouth daily 30 tablet 1     Lotion Base LOTN Apply 1 pump transdermally every  morning and at bedtime for dryness to both legs,  patient may self apply       melatonin 3 MG tablet Take 1 tablet (3  "mg) by mouth nightly as needed for sleep 30 tablet 0     menthol-zinc oxide (CALMOSEPTINE) 0.44-20.6 % OINT ointment Apply topically 4 times daily as needed for skin protection 100 g 0     multivitamin, therapeutic (THERA-VIT) TABS tablet Take 1 tablet by mouth daily 90 tablet 1     polyethylene glycol (MIRALAX) 17 GM/Dose powder Take 17 g by mouth daily 510 g 0     thiamine (B-1) 100 MG tablet Take 1 tablet (100 mg) by mouth daily 30 tablet 1     No Known Allergies  Immunization History   Administered Date(s) Administered     COVID-19,PF,Pfizer (12+ Yrs) 05/25/2021, 06/16/2021       Medications list and allergies in the facility chart have been reviewed.  Please see facility EMR for most up to date list.       Review of Systems   Patient denies fever, chills, headache, lightheadedness, dizziness, rhinorrhea, cough, congestion, shortness of breath, chest pain, palpitations, abdominal pain, n/v, diarrhea, constipation, change in appetite, change in sleep pattern, dysuria, frequency, burning or pain with urination.  Other than stated in HPI all other review of systems is negative.       Physical Exam   Vital signs:/65   Pulse 74   Temp 97.5  F (36.4  C)   Resp 16   Ht 1.702 m (5' 7\")   Wt 101.6 kg (224 lb)   SpO2 98%   BMI 35.08 kg/m     GENERAL APPEARANCE: Well developed, obese woman in no acute distress.  HEENT: normocephalic, atraumatic  sclerae anicteric, conjunctivae clear and moist, EOM intact  LUNGS: Lung sounds CTA, no adventitious sounds, respiratory effort normal.  CARD: RRR, S1, S2, without murmurs, gallops, rubs  MSK: Muscle strength and tone were equal bilaterally. Moves all extremities easily and intentionally.   EXTREMITIES: No cyanosis, clubbing or edema.  NEURO: Alert and oriented x 3.Face is symmetric.  SKIN: Inspection of the skin reveals no rashes, ulcerations or petechiae.  PSYCH;  euthymic      Labs:    Last Comprehensive Metabolic Panel:  Sodium   Date Value Ref Range Status "   12/28/2021 137 136 - 145 mmol/L Final     Potassium   Date Value Ref Range Status   12/28/2021 3.6 3.5 - 5.0 mmol/L Final     Chloride   Date Value Ref Range Status   12/28/2021 105 98 - 107 mmol/L Final     Carbon Dioxide (CO2)   Date Value Ref Range Status   12/28/2021 20 (L) 22 - 31 mmol/L Final     Anion Gap   Date Value Ref Range Status   12/28/2021 12 5 - 18 mmol/L Final     Glucose   Date Value Ref Range Status   12/28/2021 118 70 - 125 mg/dL Final     Urea Nitrogen   Date Value Ref Range Status   12/28/2021 7 (L) 8 - 22 mg/dL Final     Creatinine   Date Value Ref Range Status   12/28/2021 0.60 0.60 - 1.10 mg/dL Final     GFR Estimate   Date Value Ref Range Status   12/28/2021 >90 >60 mL/min/1.73m2 Final     Comment:     Effective December 21, 2021 eGFRcr in adults is calculated using the 2021 CKD-EPI creatinine equation which includes age and gender (Saulo et al., NEJ, DOI: 10.1056/VIQXkg5226107)     Calcium   Date Value Ref Range Status   12/28/2021 9.7 8.5 - 10.5 mg/dL Final     Lab Results   Component Value Date    WBC 4.5 12/21/2021     Lab Results   Component Value Date    RBC 2.91 12/21/2021     Lab Results   Component Value Date    HGB 9.5 12/21/2021     Lab Results   Component Value Date    HCT 28.5 12/21/2021     Lab Results   Component Value Date    MCV 98 12/21/2021     Lab Results   Component Value Date    MCH 32.6 12/21/2021     Lab Results   Component Value Date    MCHC 33.3 12/21/2021     Lab Results   Component Value Date    RDW 15.3 12/21/2021     Lab Results   Component Value Date     12/21/2021           Assessment/plan:   Alcohol use disorder, moderate, dependence (H)  Will need psychotherapy and psychiatry at discharge.  Continue with acamprosate, B vitamins and MVi.     Generalized muscle weakness  Improving, plan to discharge home on 2/2    Depression, unspecified depression type  Mood is stable this week, continue with prozac and ACP.  Melatonin at bedtime    Failure to  thrive in adult  Appetite good and voices motivation     Morbid obesity:   Does increase her morbidity    Splenic artery aneurysm:  Counseled patient on need for follow-up.  She needs to establish a primary care provider at [place] and then follow-up with interventional radiology for possible embolization.  Reassured patient that if she was discharged without embolization the infarct was stable.    Lumbar back pain  Stable, follow up with Spine clinic.  Continue with apap    Severe protein-calorie malnutrition (H)  Supplements, dietician to eval    Prolonged QT interval  Will need follow up EKG with hospital       Electronically signed by: Katina Venegas NP          Sincerely,        Katina Venegas, NP

## 2022-01-20 NOTE — PROGRESS NOTES
Code Status:  FULL CODE  Visit Type: RECHECK     Facility:   [facility] ([facility]) [[id number]]        History of Present Illness:   Hospital Admission Date: 12/13/2021 Hospital Discharge Date: 12/21/2021      Dafne Cox is a 61 year old female with a past medical history for Alcohol disorder, frequent falls, lumbago, peripheral edema, HTN, lumbar spondylosis with stenosis.  She was recently hospitalized for failure to thrive at home, weakness 2/2 thiamine deficiency due to ETOH abuse and depression.  She was started on high dose thiamine and cognitive scores showed some improvement.  She was continued on thiamine, folic acid, MVI and acamprosate.  For her depression she was started on fluoxetine due to prolonged QTc upon admission 539.  She will need a follow up EKG to monitor her QTc.     Of note; incidental finding on CT of chest showed a 2.5cm splenic artery aneurysm.  IR recommended outpatient splenic aneurysm emobolization.  Will need to follow up with after discharge.     Today, she reports feeling generally well.  She is improving with therapy to ambulating with supervision.  She is planning to complete therapy on 2/1 and will discharge to home.  She endorses a good mood.         Current Outpatient Medications   Medication Sig Dispense Refill     acamprosate (CAMPRAL) 333 MG EC tablet Take 2 tablets (666 mg) by mouth 3 times daily 180 tablet 1     acetaminophen (TYLENOL) 325 MG tablet Take 3 tablets (975 mg) by mouth every 8 hours as needed for mild pain 30 tablet 1     FLUoxetine (PROZAC) 10 MG capsule Take 1 capsule (10 mg) by mouth daily 30 capsule 1     folic acid (FOLVITE) 1 MG tablet Take 1 tablet (1 mg) by mouth daily 30 tablet 1     Lotion Base LOTN Apply 1 pump transdermally every  morning and at bedtime for dryness to both legs,  patient may self apply       melatonin 3 MG tablet Take 1 tablet (3 mg) by mouth nightly as needed for sleep 30 tablet 0     menthol-zinc oxide (CALMOSEPTINE)  "0.44-20.6 % OINT ointment Apply topically 4 times daily as needed for skin protection 100 g 0     multivitamin, therapeutic (THERA-VIT) TABS tablet Take 1 tablet by mouth daily 90 tablet 1     polyethylene glycol (MIRALAX) 17 GM/Dose powder Take 17 g by mouth daily 510 g 0     thiamine (B-1) 100 MG tablet Take 1 tablet (100 mg) by mouth daily 30 tablet 1     No Known Allergies  Immunization History   Administered Date(s) Administered     COVID-19,PF,Pfizer (12+ Yrs) 05/25/2021, 06/16/2021       Medications list and allergies in the facility chart have been reviewed.  Please see facility EMR for most up to date list.       Review of Systems   Patient denies fever, chills, headache, lightheadedness, dizziness, rhinorrhea, cough, congestion, shortness of breath, chest pain, palpitations, abdominal pain, n/v, diarrhea, constipation, change in appetite, change in sleep pattern, dysuria, frequency, burning or pain with urination.  Other than stated in HPI all other review of systems is negative.       Physical Exam   Vital signs:/65   Pulse 74   Temp 97.5  F (36.4  C)   Resp 16   Ht 1.702 m (5' 7\")   Wt 101.6 kg (224 lb)   SpO2 98%   BMI 35.08 kg/m     GENERAL APPEARANCE: Well developed, obese woman in no acute distress.  HEENT: normocephalic, atraumatic  sclerae anicteric, conjunctivae clear and moist, EOM intact  LUNGS: Lung sounds CTA, no adventitious sounds, respiratory effort normal.  CARD: RRR, S1, S2, without murmurs, gallops, rubs  MSK: Muscle strength and tone were equal bilaterally. Moves all extremities easily and intentionally.   EXTREMITIES: No cyanosis, clubbing or edema.  NEURO: Alert and oriented x 3.Face is symmetric.  SKIN: Inspection of the skin reveals no rashes, ulcerations or petechiae.  PSYCH;  euthymic      Labs:    Last Comprehensive Metabolic Panel:  Sodium   Date Value Ref Range Status   12/28/2021 137 136 - 145 mmol/L Final     Potassium   Date Value Ref Range Status   12/28/2021 " 3.6 3.5 - 5.0 mmol/L Final     Chloride   Date Value Ref Range Status   12/28/2021 105 98 - 107 mmol/L Final     Carbon Dioxide (CO2)   Date Value Ref Range Status   12/28/2021 20 (L) 22 - 31 mmol/L Final     Anion Gap   Date Value Ref Range Status   12/28/2021 12 5 - 18 mmol/L Final     Glucose   Date Value Ref Range Status   12/28/2021 118 70 - 125 mg/dL Final     Urea Nitrogen   Date Value Ref Range Status   12/28/2021 7 (L) 8 - 22 mg/dL Final     Creatinine   Date Value Ref Range Status   12/28/2021 0.60 0.60 - 1.10 mg/dL Final     GFR Estimate   Date Value Ref Range Status   12/28/2021 >90 >60 mL/min/1.73m2 Final     Comment:     Effective December 21, 2021 eGFRcr in adults is calculated using the 2021 CKD-EPI creatinine equation which includes age and gender (Saulo et al., [place], DOI: 10.1056/OUEJbg0683065)     Calcium   Date Value Ref Range Status   12/28/2021 9.7 8.5 - 10.5 mg/dL Final     Lab Results   Component Value Date    WBC 4.5 12/21/2021     Lab Results   Component Value Date    RBC 2.91 12/21/2021     Lab Results   Component Value Date    HGB 9.5 12/21/2021     Lab Results   Component Value Date    HCT 28.5 12/21/2021     Lab Results   Component Value Date    MCV 98 12/21/2021     Lab Results   Component Value Date    MCH 32.6 12/21/2021     Lab Results   Component Value Date    MCHC 33.3 12/21/2021     Lab Results   Component Value Date    RDW 15.3 12/21/2021     Lab Results   Component Value Date     12/21/2021           Assessment/plan:   Alcohol use disorder, moderate, dependence (H)  Will need psychotherapy and psychiatry at discharge.  Continue with acamprosate, B vitamins and MVi.     Generalized muscle weakness  Improving, plan to discharge home on 2/2    Depression, unspecified depression type  Mood is stable this week, continue with prozac and ACP.  Melatonin at bedtime    Failure to thrive in adult  Appetite good and voices motivation     Morbid obesity:   Does increase her  morbidity    Splenic artery aneurysm:  Counseled patient on need for follow-up.  She needs to establish a primary care provider at [place] and then follow-up with interventional radiology for possible embolization.  Reassured patient that if she was discharged without embolization the infarct was stable.    Lumbar back pain  Stable, follow up with Spine clinic.  Continue with apap    Severe protein-calorie malnutrition (H)  Supplements, dietician to eval    Prolonged QT interval  Will need follow up EKG with hospital     DISCHARGE PLAN/FACE TO FACE:  I certify that this patient is under my care and that I, or a nurse practitioner or physician's assistant working with me, had a face-to-face encounter that meets the physician face-to-face encounter requirements with this patient.   Date of Face-to-Face Encounter: 1/20/2022    I certify that, based on my findings, the following services are medically necessary home health services: RN, PT, OT    My clinical findings support the need for the above skilled services because: RN for medication education, PT/OT for ongoing endurance and strengthening    This patient is homebound because: She requires maximum effort to get out into the community on a regular basis.     The patient is, or has been, under my care and I have initiated the establishment of the plan of care. This patient will be followed by a physician who will periodically review the plan of care.              Electronically signed by: Katina Venegas NP

## 2022-02-01 ENCOUNTER — LAB REQUISITION (OUTPATIENT)
Dept: LAB | Facility: CLINIC | Age: 62
End: 2022-02-01
Payer: COMMERCIAL

## 2022-02-01 DIAGNOSIS — U07.1 COVID-19: ICD-10-CM

## 2022-02-16 ENCOUNTER — LAB REQUISITION (OUTPATIENT)
Dept: LAB | Facility: CLINIC | Age: 62
End: 2022-02-16

## 2022-02-16 DIAGNOSIS — Z13.220 ENCOUNTER FOR SCREENING FOR LIPOID DISORDERS: ICD-10-CM

## 2022-02-16 DIAGNOSIS — E51.2 WERNICKE'S ENCEPHALOPATHY: ICD-10-CM

## 2022-02-16 LAB
ALBUMIN SERPL-MCNC: 4 G/DL (ref 3.5–5)
ALP SERPL-CCNC: 84 U/L (ref 45–120)
ALT SERPL W P-5'-P-CCNC: 9 U/L (ref 0–45)
ANION GAP SERPL CALCULATED.3IONS-SCNC: 22 MMOL/L (ref 5–18)
AST SERPL W P-5'-P-CCNC: 14 U/L (ref 0–40)
BILIRUB SERPL-MCNC: 1.1 MG/DL (ref 0–1)
BUN SERPL-MCNC: 8 MG/DL (ref 8–22)
CALCIUM SERPL-MCNC: 10.2 MG/DL (ref 8.5–10.5)
CHLORIDE BLD-SCNC: 100 MMOL/L (ref 98–107)
CHOLEST SERPL-MCNC: 203 MG/DL
CO2 SERPL-SCNC: 15 MMOL/L (ref 22–31)
CREAT SERPL-MCNC: 0.76 MG/DL (ref 0.6–1.1)
FASTING STATUS PATIENT QL REPORTED: ABNORMAL
GFR SERPL CREATININE-BSD FRML MDRD: 89 ML/MIN/1.73M2
GLUCOSE BLD-MCNC: 128 MG/DL (ref 70–125)
HDLC SERPL-MCNC: 32 MG/DL
LDLC SERPL CALC-MCNC: 133 MG/DL
POTASSIUM BLD-SCNC: 3.3 MMOL/L (ref 3.5–5)
PROT SERPL-MCNC: 7.6 G/DL (ref 6–8)
SODIUM SERPL-SCNC: 137 MMOL/L (ref 136–145)
TRIGL SERPL-MCNC: 189 MG/DL

## 2022-02-16 PROCEDURE — 80061 LIPID PANEL: CPT | Performed by: PHYSICIAN ASSISTANT

## 2022-02-16 PROCEDURE — 80053 COMPREHEN METABOLIC PANEL: CPT | Performed by: PHYSICIAN ASSISTANT

## 2022-03-02 ENCOUNTER — LAB REQUISITION (OUTPATIENT)
Dept: LAB | Facility: CLINIC | Age: 62
End: 2022-03-02

## 2022-03-02 DIAGNOSIS — E87.6 HYPOKALEMIA: ICD-10-CM

## 2022-03-02 LAB
ALBUMIN SERPL-MCNC: 3.4 G/DL (ref 3.5–5)
ALP SERPL-CCNC: 107 U/L (ref 45–120)
ALT SERPL W P-5'-P-CCNC: <9 U/L (ref 0–45)
ANION GAP SERPL CALCULATED.3IONS-SCNC: 20 MMOL/L (ref 5–18)
AST SERPL W P-5'-P-CCNC: 12 U/L (ref 0–40)
BILIRUB SERPL-MCNC: 0.9 MG/DL (ref 0–1)
BUN SERPL-MCNC: <2 MG/DL (ref 8–22)
CALCIUM SERPL-MCNC: 9.2 MG/DL (ref 8.5–10.5)
CHLORIDE BLD-SCNC: 97 MMOL/L (ref 98–107)
CO2 SERPL-SCNC: 22 MMOL/L (ref 22–31)
CREAT SERPL-MCNC: 0.72 MG/DL (ref 0.6–1.1)
GFR SERPL CREATININE-BSD FRML MDRD: >90 ML/MIN/1.73M2
GLUCOSE BLD-MCNC: 106 MG/DL (ref 70–125)
POTASSIUM BLD-SCNC: 2.9 MMOL/L (ref 3.5–5)
PROT SERPL-MCNC: 7.1 G/DL (ref 6–8)
SODIUM SERPL-SCNC: 139 MMOL/L (ref 136–145)

## 2022-03-02 PROCEDURE — 80053 COMPREHEN METABOLIC PANEL: CPT | Performed by: STUDENT IN AN ORGANIZED HEALTH CARE EDUCATION/TRAINING PROGRAM

## 2022-05-29 ENCOUNTER — HEALTH MAINTENANCE LETTER (OUTPATIENT)
Age: 62
End: 2022-05-29

## 2022-10-03 ENCOUNTER — HEALTH MAINTENANCE LETTER (OUTPATIENT)
Age: 62
End: 2022-10-03

## 2023-03-16 NOTE — PLAN OF CARE
Occupational Therapy Discharge Summary    Reason for therapy discharge:    Discharged to transitional care facility.    Progress towards therapy goal(s). See goals on Care Plan in [facility] electronic health record for goal details.  Goals partially met.  Barriers to achieving goals:   discharge from facility.    Therapy recommendation(s):    Continued therapy is recommended.  Rationale/Recommendations:   .       Negative

## 2023-06-04 ENCOUNTER — HEALTH MAINTENANCE LETTER (OUTPATIENT)
Age: 63
End: 2023-06-04

## 2024-07-27 ENCOUNTER — HEALTH MAINTENANCE LETTER (OUTPATIENT)
Age: 64
End: 2024-07-27